# Patient Record
Sex: MALE | Race: WHITE | URBAN - METROPOLITAN AREA
[De-identification: names, ages, dates, MRNs, and addresses within clinical notes are randomized per-mention and may not be internally consistent; named-entity substitution may affect disease eponyms.]

---

## 2020-06-08 ENCOUNTER — VIRTUAL VISIT (OUTPATIENT)
Dept: PEDIATRICS | Facility: CLINIC | Age: 35
End: 2020-06-08
Payer: COMMERCIAL

## 2020-06-08 DIAGNOSIS — F41.1 GENERALIZED ANXIETY DISORDER: Primary | ICD-10-CM

## 2020-06-08 PROCEDURE — 99203 OFFICE O/P NEW LOW 30 MIN: CPT | Mod: 95 | Performed by: INTERNAL MEDICINE

## 2020-06-08 RX ORDER — LORAZEPAM 0.5 MG/1
0.5 TABLET ORAL EVERY 6 HOURS PRN
Qty: 10 TABLET | Refills: 0 | Status: SHIPPED | OUTPATIENT
Start: 2020-06-08 | End: 2020-06-15

## 2020-06-08 ASSESSMENT — PATIENT HEALTH QUESTIONNAIRE - PHQ9
5. POOR APPETITE OR OVEREATING: NEARLY EVERY DAY
SUM OF ALL RESPONSES TO PHQ QUESTIONS 1-9: 8

## 2020-06-08 ASSESSMENT — ANXIETY QUESTIONNAIRES
5. BEING SO RESTLESS THAT IT IS HARD TO SIT STILL: NEARLY EVERY DAY
2. NOT BEING ABLE TO STOP OR CONTROL WORRYING: NEARLY EVERY DAY
IF YOU CHECKED OFF ANY PROBLEMS ON THIS QUESTIONNAIRE, HOW DIFFICULT HAVE THESE PROBLEMS MADE IT FOR YOU TO DO YOUR WORK, TAKE CARE OF THINGS AT HOME, OR GET ALONG WITH OTHER PEOPLE: SOMEWHAT DIFFICULT
3. WORRYING TOO MUCH ABOUT DIFFERENT THINGS: SEVERAL DAYS
GAD7 TOTAL SCORE: 14
7. FEELING AFRAID AS IF SOMETHING AWFUL MIGHT HAPPEN: NOT AT ALL
1. FEELING NERVOUS, ANXIOUS, OR ON EDGE: NEARLY EVERY DAY
6. BECOMING EASILY ANNOYED OR IRRITABLE: SEVERAL DAYS

## 2020-06-08 NOTE — PATIENT INSTRUCTIONS
"It was good talking with you earlier today.  Here's a summary of what we discussed:    1)  Set up both a \"Lab Appointment\" (to get your blood and/or urine tests done) and a \"Nurse Only Appointment\" (to get your blood pressure rechecked) sometime in the next 2 weeks.  You can schedule these by calling  or using your [employer] account, if you have it.     2)   the book \"Full Catastrophe Living\", by Dewayne Skinner, and begin the course described therein.     3)  Call for counseling:  Our counselor at [facility] is Deangelo Mayorga, and the main number for [facility] counseling is [phone].  If he is not available, you can set up an appointment with another [facility] counselor that is.     You can also choose the [facility] group (which contracts with [facility]) at [phone].    Other non-[facility] counselors in the area:     [facility] [phone]      4)  We can start you on an occasional dose of lorazepam: infrequently.      5)  Set up a follow up in 2 weeks.     Reginald Patel MD  Internal Medicine and Pediatrics     "

## 2020-06-08 NOTE — PROGRESS NOTES
"Wicho Garner is a 34 year old male who is being evaluated via a billable telephone visit.      **PHQ9 and GAD7 Updated today. Pt previously used anxiety med, worked well, feels anxiety is affected mostly by his environment. Would like to discuss options.    The patient has been notified of following:     \"This telephone visit will be conducted via a call between you and your physician/provider. We have found that certain health care needs can be provided without the need for a physical exam.  This service lets us provide the care you need with a short phone conversation.  If a prescription is necessary we can send it directly to your pharmacy.  If lab work is needed we can place an order for that and you can then stop by our lab to have the test done at a later time.    Telephone visits are billed at different rates depending on your insurance coverage. During this emergency period, for some insurers they may be billed the same as an in-person visit.  Please reach out to your insurance provider with any questions.    If during the course of the call the physician/provider feels a telephone visit is not appropriate, you will not be charged for this service.\"    Patient has given verbal consent for Telephone visit?  Yes Le Matthew CMA on 6/8/2020 at 2:10 PM      What phone number would you like to be contacted at? [phone]    How would you like to obtain your AVS? Mail a copy    Subjective     Wicho Garner is a 34 year old male who presents via phone visit today for the following health issues:    HPI     Anxiety:    Had been contemplating this visit for about a month.  About a year ago, while trying to quit smoking, though had panic attack.  Tried lorazepam very infrequently in the past, about 10 years.  But then resumed smoking and did not need it.  Now has a chronic cough and would like to quit smoking.    Now most evenings feels like can be \"just sitting there\", but then suddenly can get " shortness of breath, feet feel cold, gets tingly all over, and starts to panic.  Cough becomes more obsessive.  Symptoms last 60-90 minutes.  Cannot focus on things.    Now is starting to happen in the AM.  Wondering about blood sugar levels, etc.    Has a toxic relationship with his exwife.  (They never really ).     Works as a coin enthusiast.  Lives by self.    Not suicidal.     Patient Active Problem List   Diagnosis     CARDIOVASCULAR SCREENING; LDL GOAL LESS THAN 160     Generalized anxiety disorder     History reviewed. No pertinent surgical history.    Social History     Tobacco Use     Smoking status: Current Every Day Smoker     Packs/day: 1.00     Types: Cigarettes     Smokeless tobacco: Current User   Substance Use Topics     Alcohol use: Yes     Family History   Problem Relation Age of Onset     Hypertension Father      Cerebrovascular Disease Father      Heart Disease Father         Heart attack     Lipids Father          Current Outpatient Medications   Medication Sig Dispense Refill     lorazepam (ATIVAN) 1 MG tablet Take 1 tablet by mouth every 6 hours as needed for anxiety. 20 tablet 0     sertraline (ZOLOFT) 50 MG tablet Take 1 tablet by mouth daily. 30 tablet 1     BP Readings from Last 3 Encounters:   07/11/11 144/64   10/28/10 150/70   04/01/10 114/58    Wt Readings from Last 3 Encounters:   07/11/11 96.8 kg (213 lb 4.8 oz)   10/28/10 100.7 kg (222 lb)   04/01/10 100.8 kg (222 lb 4.8 oz)                    Reviewed and updated as needed this visit by Provider         Review of Systems   CONSTITUTIONAL: NEGATIVE for fever, chills, change in weight  INTEGUMENTARY/SKIN: NEGATIVE for worrisome rashes, moles or lesions  EYES: NEGATIVE for vision changes or irritation  ENT/MOUTH: NEGATIVE for ear, mouth and throat problems  RESP: NEGATIVE for significant cough or SOB  BREAST: NEGATIVE for masses, tenderness or discharge  CV: NEGATIVE for chest pain, palpitations or peripheral edema  GI:  "NEGATIVE for nausea, abdominal pain, heartburn, or change in bowel habits  : NEGATIVE for frequency, dysuria, or hematuria  MUSCULOSKELETAL: NEGATIVE for significant arthralgias or myalgia  NEURO: NEGATIVE for weakness, dizziness or paresthesias  ENDOCRINE: NEGATIVE for temperature intolerance, skin/hair changes  HEME: NEGATIVE for bleeding problems  PSYCHIATRIC: NEGATIVE for changes in mood or affect       Objective   Reported vitals:  There were no vitals taken for this visit.   healthy, alert and no distress  PSYCH: Alert and oriented times 3; coherent speech, normal   rate and volume, able to articulate logical thoughts, able   to abstract reason, no tangential thoughts, no hallucinations   or delusions  His affect is normal  RESP: No cough, no audible wheezing, able to talk in full sentences  Remainder of exam unable to be completed due to telephone visits    Diagnostic Test Results:  Labs reviewed in Epic        Assessment/Plan:  1. Generalized anxiety disorder    Patient with stress surrounding his son's mom, and what sounds like classic panic attacks.  Would like to hold off on serotonin / norepinephrine reuptake inhibitor or selective serotonin reuptake inhibitor, but willing to consider if recommendations below not helping.   Patient Instructions   It was good talking with you earlier today.  Here's a summary of what we discussed:    1)  Set up both a \"Lab Appointment\" (to get your blood and/or urine tests done) and a \"Nurse Only Appointment\" (to get your blood pressure rechecked) sometime in the next 2 weeks.  You can schedule these by calling  or using your [employer] account, if you have it.     2)   the book \"Full Catastrophe Living\", by Dewayne Skinner, and begin the course described therein.     3)  Call for counseling:  Our counselor at [facility] is Deangelo Mayorga, and the main number for [place] counseling is [phone].  If he is not available, you can set up an appointment with " another [place] counselor that is.     You can also choose the [facility] group (which contracts with [place]) at [phone].    Other non-[place] counselors in the area:     [facility] [phone]      4)  We can start you on an occasional dose of lorazepam: infrequently.      Reginald Patel MD  Internal Medicine and Pediatrics        - TSH with free T4 reflex; Future  - Comprehensive metabolic panel (BMP + Alb, Alk Phos, ALT, AST, Total. Bili, TP); Future  - Vitamin D Deficiency; Future    No follow-ups on file.      Phone call duration:  28 minutes    Reginald Patel MD

## 2020-06-09 ASSESSMENT — ANXIETY QUESTIONNAIRES: GAD7 TOTAL SCORE: 14

## 2020-06-12 ENCOUNTER — TELEPHONE (OUTPATIENT)
Dept: PEDIATRICS | Facility: CLINIC | Age: 35
End: 2020-06-12

## 2020-06-12 DIAGNOSIS — F41.1 GENERALIZED ANXIETY DISORDER: ICD-10-CM

## 2020-06-12 RX ORDER — LORAZEPAM 0.5 MG/1
0.5 TABLET ORAL EVERY 6 HOURS PRN
Qty: 10 TABLET | Refills: 0 | Status: CANCELLED | OUTPATIENT
Start: 2020-06-12

## 2020-06-12 NOTE — TELEPHONE ENCOUNTER
Medication Question or Refill  Who is calling: Patient  What medication are you calling about (include dose and sig)?: Pending Prescriptions:                       Disp   Refills    LORazepam (ATIVAN) 0.5 MG tablet          10 tab*0            Sig: Take 1 tablet (0.5 mg) by mouth every 6 hours as           needed for anxiety    Controlled Substance Agreement on file: No  Who prescribed the medication?: Dr. Patel  Do you need a refill? No  When did you use the medication last? The other day  Patient offered an appointment? No  Do you have any questions or concerns?  Yes: Pt is wondering if he was given the wrong dosage. Pt states that they are having no effect and that he thinks he may have been given 0.5 instead of 1MG by accident.   Requested Pharmacy: not. asked  Okay to leave a detailed message?: Yes at Cell number on file:    Telephone Information:   Mobile [phone]       Janet Connolly on 6/12/2020 at 5:03 PM

## 2020-06-15 RX ORDER — LORAZEPAM 1 MG/1
1 TABLET ORAL EVERY 8 HOURS PRN
Qty: 10 TABLET | Refills: 0 | Status: SHIPPED | OUTPATIENT
Start: 2020-06-15 | End: 2023-11-27

## 2020-06-15 NOTE — TELEPHONE ENCOUNTER
Dr. Patel-  He had a telephone visit with you on 6/8/20. Says Lorazepam 0.5 is having no effect. He took 1mg in the past.   Please advise. Destinee Renee RN on 6/15/2020 at 8:13 AM

## 2020-06-29 ENCOUNTER — VIRTUAL VISIT (OUTPATIENT)
Dept: PEDIATRICS | Facility: CLINIC | Age: 35
End: 2020-06-29
Payer: COMMERCIAL

## 2020-06-29 DIAGNOSIS — F41.1 GENERALIZED ANXIETY DISORDER: Primary | ICD-10-CM

## 2020-06-29 PROCEDURE — 99213 OFFICE O/P EST LOW 20 MIN: CPT | Mod: 95 | Performed by: INTERNAL MEDICINE

## 2020-06-29 PROCEDURE — 96127 BRIEF EMOTIONAL/BEHAV ASSMT: CPT | Performed by: INTERNAL MEDICINE

## 2020-06-29 ASSESSMENT — ANXIETY QUESTIONNAIRES
1. FEELING NERVOUS, ANXIOUS, OR ON EDGE: NOT AT ALL
6. BECOMING EASILY ANNOYED OR IRRITABLE: SEVERAL DAYS
2. NOT BEING ABLE TO STOP OR CONTROL WORRYING: NOT AT ALL
GAD7 TOTAL SCORE: 1
3. WORRYING TOO MUCH ABOUT DIFFERENT THINGS: NOT AT ALL
5. BEING SO RESTLESS THAT IT IS HARD TO SIT STILL: NOT AT ALL
IF YOU CHECKED OFF ANY PROBLEMS ON THIS QUESTIONNAIRE, HOW DIFFICULT HAVE THESE PROBLEMS MADE IT FOR YOU TO DO YOUR WORK, TAKE CARE OF THINGS AT HOME, OR GET ALONG WITH OTHER PEOPLE: NOT DIFFICULT AT ALL
7. FEELING AFRAID AS IF SOMETHING AWFUL MIGHT HAPPEN: NOT AT ALL

## 2020-06-29 ASSESSMENT — PATIENT HEALTH QUESTIONNAIRE - PHQ9: 5. POOR APPETITE OR OVEREATING: NOT AT ALL

## 2020-06-29 NOTE — LETTER
[facility]  [address]  [address]  [place] [id number] [phone]        June 29, 2020      Wicho Garner  [address] [place] [address]      Dear Wicho,    As your health care provider, it is my medical opinion that you should avoid close contact with others and work from home if possible during this COVID-19 pandemic.  The current pandemic, especially when exposed to others whose social distancing and care is uncertain, may exacerbate your anxiety    Reginald Patel MD    [facility]

## 2020-06-29 NOTE — PROGRESS NOTES
"Wicho Garner is a 34 year old male who is being evaluated via a billable telephone visit.      The patient has been notified of following:     \"This telephone visit will be conducted via a call between you and your physician/provider. We have found that certain health care needs can be provided without the need for a physical exam.  This service lets us provide the care you need with a short phone conversation.  If a prescription is necessary we can send it directly to your pharmacy.  If lab work is needed we can place an order for that and you can then stop by our lab to have the test done at a later time.    Telephone visits are billed at different rates depending on your insurance coverage. During this emergency period, for some insurers they may be billed the same as an in-person visit.  Please reach out to your insurance provider with any questions.    If during the course of the call the physician/provider feels a telephone visit is not appropriate, you will not be charged for this service.\"    Patient has given verbal consent for Telephone visit?  Yes    What phone number would you like to be contacted at? [phone]    How would you like to obtain your AVS? Mail a copy    Subjective     Wicho Garner is a 34 year old male who presents via phone visit today for the following health issues:    Has been feeling great for the last 7-8 days now.  This has happened every since he quit smoking.      Believes that cutting out the smoking has helped.      Has used 6 doses of lorazepam; about every other day initially but none for the last 1 week. Never even had to fill the 1 mg strength; just taking the 0.5 mg strength.    No drinking or marijuana.     Never saw counselor or got book I recommended.     No suicidal ideation or homicidal ideation.  Sleeping well.  Works from home and is comfortable with that.       HPI     Anxiety Follow-Up    How are you doing with your anxiety since your last visit? " Improved, have only taken medication 4 times since 6/8/20, no SOB, blurry vision, has not smoked     Are you having other symptoms that might be associated with anxiety? No    Have you had a significant life event? No     Are you feeling depressed? No    Do you have any concerns with your use of alcohol or other drugs? No    Social History     Tobacco Use     Smoking status: Current Every Day Smoker     Packs/day: 1.00     Types: Cigarettes     Smokeless tobacco: Current User   Substance Use Topics     Alcohol use: Yes     Drug use: No     KRISHNA-7 SCORE 6/8/2020 6/29/2020   Total Score 14 1     PHQ 6/8/2020   PHQ-9 Total Score 8   Q9: Thoughts of better off dead/self-harm past 2 weeks Not at all           How many servings of fruits and vegetables do you eat daily?  2-3    On average, how many sweetened beverages do you drink each day (Examples: soda, juice, sweet tea, etc.  Do NOT count diet or artificially sweetened beverages)?   1    How many days per week do you exercise enough to make your heart beat faster?  0    How many minutes a day do you exercise enough to make your heart beat faster? 0    How many days per week do you miss taking your medication? 0        Patient Active Problem List   Diagnosis     Generalized anxiety disorder     History reviewed. No pertinent surgical history.    Social History     Tobacco Use     Smoking status: Current Every Day Smoker     Packs/day: 1.00     Types: Cigarettes     Smokeless tobacco: Current User   Substance Use Topics     Alcohol use: Yes     Family History   Problem Relation Age of Onset     Hypertension Father      Cerebrovascular Disease Father      Heart Disease Father         Heart attack     Lipids Father          Current Outpatient Medications   Medication Sig Dispense Refill     LORazepam (ATIVAN) 1 MG tablet Take 1 tablet (1 mg) by mouth every 8 hours as needed for anxiety 10 tablet 0     No Known Allergies  BP Readings from Last 3 Encounters:   07/11/11  144/64   10/28/10 150/70   04/01/10 114/58    Wt Readings from Last 3 Encounters:   07/11/11 96.8 kg (213 lb 4.8 oz)   10/28/10 100.7 kg (222 lb)   04/01/10 100.8 kg (222 lb 4.8 oz)                    Reviewed and updated as needed this visit by Provider         Review of Systems   CONSTITUTIONAL: NEGATIVE for fever, chills, change in weight  INTEGUMENTARY/SKIN: NEGATIVE for worrisome rashes, moles or lesions  EYES: NEGATIVE for vision changes or irritation  ENT/MOUTH: NEGATIVE for ear, mouth and throat problems  RESP: NEGATIVE for significant cough or SOB  BREAST: NEGATIVE for masses, tenderness or discharge  CV: NEGATIVE for chest pain, palpitations or peripheral edema  GI: NEGATIVE for nausea, abdominal pain, heartburn, or change in bowel habits  : NEGATIVE for frequency, dysuria, or hematuria  MUSCULOSKELETAL: NEGATIVE for significant arthralgias or myalgia  NEURO: NEGATIVE for weakness, dizziness or paresthesias  ENDOCRINE: NEGATIVE for temperature intolerance, skin/hair changes  HEME: NEGATIVE for bleeding problems  PSYCHIATRIC: NEGATIVE for changes in mood or affect       Objective   Reported vitals:  There were no vitals taken for this visit.   healthy, alert and no distress  PSYCH: Alert and oriented times 3; coherent speech, normal   rate and volume, able to articulate logical thoughts, able   to abstract reason, no tangential thoughts, no hallucinations   or delusions  His affect is normal  RESP: No cough, no audible wheezing, able to talk in full sentences  Remainder of exam unable to be completed due to telephone visits    Diagnostic Test Results:  Labs reviewed in Epic        Assessment/Plan:  1. Generalized anxiety disorder  Doing much better without any daily meds; has used lorazepam 0.5 mg only 6 times, and none in the last 1 week.     Follow up in 6 months.  Consider getting literature and setting up counseling if not contuing to improve.    2.  Will write generic letter about returning to work  .      No follow-ups on file.      Phone call duration:  22 minutes    Reginald Patel MD

## 2020-06-30 ASSESSMENT — ANXIETY QUESTIONNAIRES: GAD7 TOTAL SCORE: 1

## 2020-07-29 NOTE — PATIENT INSTRUCTIONS
"It was good talking with you earlier today.  Here's a summary of what we discussed:    1)  No changes in your treatment; consider picking up the book \"Full Catastrophe Living\", by Dewayne Skinner, and begin the course described therein.     2)  See my letter for your employer today.    3) follow up in 6 months for a complete physcial exam.     Reginald Patel MD  Internal Medicine and Pediatrics     " see HPI

## 2021-06-18 ENCOUNTER — VIRTUAL VISIT (OUTPATIENT)
Dept: PEDIATRICS | Facility: CLINIC | Age: 36
End: 2021-06-18
Payer: COMMERCIAL

## 2021-06-18 DIAGNOSIS — F41.1 GENERALIZED ANXIETY DISORDER: Primary | ICD-10-CM

## 2021-06-18 PROCEDURE — 99213 OFFICE O/P EST LOW 20 MIN: CPT | Mod: GT | Performed by: INTERNAL MEDICINE

## 2021-06-18 PROCEDURE — 96127 BRIEF EMOTIONAL/BEHAV ASSMT: CPT | Performed by: INTERNAL MEDICINE

## 2021-06-18 ASSESSMENT — ANXIETY QUESTIONNAIRES
7. FEELING AFRAID AS IF SOMETHING AWFUL MIGHT HAPPEN: NOT AT ALL
2. NOT BEING ABLE TO STOP OR CONTROL WORRYING: SEVERAL DAYS
6. BECOMING EASILY ANNOYED OR IRRITABLE: SEVERAL DAYS
1. FEELING NERVOUS, ANXIOUS, OR ON EDGE: NEARLY EVERY DAY
IF YOU CHECKED OFF ANY PROBLEMS ON THIS QUESTIONNAIRE, HOW DIFFICULT HAVE THESE PROBLEMS MADE IT FOR YOU TO DO YOUR WORK, TAKE CARE OF THINGS AT HOME, OR GET ALONG WITH OTHER PEOPLE: NOT DIFFICULT AT ALL
3. WORRYING TOO MUCH ABOUT DIFFERENT THINGS: SEVERAL DAYS
GAD7 TOTAL SCORE: 8
5. BEING SO RESTLESS THAT IT IS HARD TO SIT STILL: SEVERAL DAYS

## 2021-06-18 ASSESSMENT — PATIENT HEALTH QUESTIONNAIRE - PHQ9
SUM OF ALL RESPONSES TO PHQ QUESTIONS 1-9: 6
5. POOR APPETITE OR OVEREATING: SEVERAL DAYS

## 2021-06-18 NOTE — PATIENT INSTRUCTIONS
It was good talking with you earlier today.  Here's a summary of what we discussed:    1)  Let's have you fax your paperwork to me at     2)  Let's have you call us when you might need a refill for the lorazepam.    3)  I'd consider getting your COVID shot, and if not continuing to wear a mask in public.    We are now scheduling all people age 12 and older for COVID-19 vaccines (patients age 12-17 can only  the Pfizer vaccine).     To schedule your appointment please log in to [email or web address] using this link to see when and where we have openings. Appointments open up throughout the week, check back for additional openings.     If there are no appointments left, you will be unable to schedule. If you have technical difficulty using [email or web address], call [phone] for assistance.  If you would like to be added to our standby list, do that on our website: here.    Patients 12-17 years old must schedule their appointment at a location offering the Pfizer vaccine.  First dose Pfizer vaccines are available at the following sites with convenient hours, including Saturday appointments:    [facility] (former clinic, now serving as a vaccination-only site)    [facility]    Appointments for  Moderna and Jose (Medardo& Medardo) COVID-19 Vaccines for those 18 years and older is available at many locations in our system.  More information is on our website: [email or web address]. Check this website to stay up to date on COVID-19 vaccination information       Reginald Patel MD  Internal Medicine and Pediatrics

## 2021-06-18 NOTE — PROGRESS NOTES
Wicho is a 35 year old who is being evaluated via a billable telephone visit.      What phone number would you like to be contacted at? [phone]  How would you like to obtain your AVS? Mail a copy    Assessment & Plan     Anxiety:    Generally doing well.  He has successfully stopped marijuana.  Does not drink.  He is asking me to fill out paperwork for him to work from home, owing to anxiety, which seems reasonable. No lung conditions (he does not want COVID vaccine) , buthe main reason would be anxiety around social interactions in larger groups at work.  He does not want an end date to his restrictions.         Patient Instructions   It was good talking with you earlier today.  Here's a summary of what we discussed:    1)  Let's have you fax your paperwork to me at     2)  Let's have you call us when you might need a refill for the lorazepam.    3)  I'd consider getting your COVID shot, and if not continuing to wear a mask in public.    We are now scheduling all people age 12 and older for COVID-19 vaccines (patients age 12-17 can only  the Pfizer vaccine).     To schedule your appointment please log in to [employer] using this link to see when and where we have openings. Appointments open up throughout the week, check back for additional openings.     If there are no appointments left, you will be unable to schedule. If you have technical difficulty using [employer], call [phone] for assistance.  If you would like to be added to our standby list, do that on our website: here.    Patients 12-17 years old must schedule their appointment at a location offering the Pfizer vaccine.  First dose Pfizer vaccines are available at the following sites with convenient hours, including Saturday appointments:    [facility] (former clinic, now serving as a vaccination-only site)    [facility]  [facility]    Appointments for  Moderna and Jose (Medardo& Medardo) COVID-19 Vaccines for those 18 years and older is available at many locations in our system.  More information is on our website: [email or web address]. Check this website to stay up to date on COVID-19 vaccination information       Reginald Patel MD  Internal Medicine and Pediatrics                   Tobacco Cessation:   reports that he has been smoking cigarettes. He has been smoking about 1.00 pack per day. He uses smokeless tobacco.          Return in about 6 months (around 12/18/2021) for medicine followup, in person, with me.    Reginald Patel MD  [facility] [place] Sands is a 35 year old who presents for the following health issues     HPI     Anxiety Follow-Up    How are you doing with your anxiety since your last visit? Improved, pt working from home, not looking to keep take anxiety meds     Are you having other symptoms that might be associated with anxiety? No    Have you had a significant life event? No     Are you feeling depressed? Yes:  very recently     Do you have any concerns with your use of alcohol or other drugs? No    Has been doing very well.  Still has a few of his lorazepam.  He'd like to not take daily meds.  He likes working from home; this worked very well for him.  Has been spending more time with his children, and his wife.     He notes that since working from home, very infrequently has panic attacks.   He'd much prefer that he stay working from home, but his employer is requesting that he work from the office. He therefore needs some paperwork filled out stating that he should work from home.    His KRISHNA score went from 14 to 8 today.    No marijuana, no alcohol.     He is worried about a lung condition that might put him at risk for returning to work, although there is no lung abnormalities on his chest x-ray.      Social History      Tobacco Use     Smoking status: Current Every Day Smoker     Packs/day: 1.00     Types: Cigarettes     Smokeless tobacco: Current User   Substance Use Topics     Alcohol use: Yes     Drug use: No     KRISHNA-7 SCORE 6/8/2020 6/29/2020 6/18/2021   Total Score 14 1 8     PHQ 6/8/2020 6/18/2021   PHQ-9 Total Score 8 6   Q9: Thoughts of better off dead/self-harm past 2 weeks Not at all Not at all           Review of Systems   CONSTITUTIONAL: NEGATIVE for fever, chills, change in weight  INTEGUMENTARY/SKIN: NEGATIVE for worrisome rashes, moles or lesions  EYES: NEGATIVE for vision changes or irritation  ENT/MOUTH: NEGATIVE for ear, mouth and throat problems  RESP: NEGATIVE for significant cough or SOB  BREAST: NEGATIVE for masses, tenderness or discharge  CV: NEGATIVE for chest pain, palpitations or peripheral edema  GI: NEGATIVE for nausea, abdominal pain, heartburn, or change in bowel habits  : NEGATIVE for frequency, dysuria, or hematuria  MUSCULOSKELETAL: NEGATIVE for significant arthralgias or myalgia  NEURO: NEGATIVE for weakness, dizziness or paresthesias  ENDOCRINE: NEGATIVE for temperature intolerance, skin/hair changes  HEME: NEGATIVE for bleeding problems  PSYCHIATRIC: NEGATIVE for changes in mood or affect      Objective           Vitals:  No vitals were obtained today due to virtual visit.    Physical Exam   healthy, alert and no distress  PSYCH: Alert and oriented times 3; coherent speech, normal   rate and volume, able to articulate logical thoughts, able   to abstract reason, no tangential thoughts, no hallucinations   or delusions  His affect is normal  RESP: No cough, no audible wheezing, able to talk in full sentences  Remainder of exam unable to be completed due to telephone visits                Phone call duration: 23 minutes

## 2021-06-19 ASSESSMENT — ANXIETY QUESTIONNAIRES: GAD7 TOTAL SCORE: 8

## 2021-06-21 ENCOUNTER — TELEPHONE (OUTPATIENT)
Dept: PEDIATRICS | Facility: CLINIC | Age: 36
End: 2021-06-21
Payer: COMMERCIAL

## 2021-06-21 NOTE — TELEPHONE ENCOUNTER
General Call:     Who is calling:  Patient    Reason for Call:  Patient called to give fax number to fax letter. Fax # [phone]. Letter was faxed to [facility]. Letter was put in fax drawer for Dr. Patel at station.    What are your questions or concerns:  Wants letter faxed to number provided.    Date of last appointment with provider: 6/18/21    Okay to leave a detailed message:Yes at Cell number on file:    Telephone Information:   Mobile [phone]

## 2021-06-28 NOTE — TELEPHONE ENCOUNTER
Patient calling in and is checking on the status of this request. Please call patient with an update.

## 2021-07-05 NOTE — TELEPHONE ENCOUNTER
Pt called and needed Dr Patel to correct the note. But looking at it he never said he was going to work part time- it just said he had a follow up in 6 months to review and extend it than.. no mention of part time work. Called pt and left message on machine and pt can call w more questions on my direct line.     Akua Schroeder EMT 11:43 AM on July 5, 2021  [facility] Health Guide  [phone]

## 2021-08-24 ENCOUNTER — TELEPHONE (OUTPATIENT)
Dept: PEDIATRICS | Facility: CLINIC | Age: 36
End: 2021-08-24

## 2021-08-24 NOTE — TELEPHONE ENCOUNTER
Please review and if you except we can send this through my chart. Used your work note from last year 6-29-20. Pt had a virtual visit w you on 6-18-21.    thanks   Akua Schroeder EMT 3:45 PM on August 24, 2021  [facility] Health Guide  [phone]

## 2021-08-25 NOTE — TELEPHONE ENCOUNTER
Reason for Call:  Form, our goal is to have forms completed with 72 hours, however, some forms may require a visit or additional information.    Type of letter, form or note:  employer    Who is the form from?: employer Sent 8/24 later in day (if other please explain)    Where did the form come from: form was faxed in    What clinic location was the form placed at?: [facility]    Where the form was placed: n/a Been faxed to provider 8/24 Patient request to have returned by fax after filled out. Fax is provided in paperwork sent.    What number is listed as a contact on the form?: n/a Contact the patient on file       Additional comments: Please call the number provided if there are questins    Call taken on 8/25/2021 at 5:09 PM by Britney Arevalo

## 2021-08-26 NOTE — TELEPHONE ENCOUNTER
I didn't get paperwork.  I just wrote a letter.  If needs paperwork, he has to get it to us.    Reginald Patel MD  Internal Medicine and Pediatrics

## 2021-08-26 NOTE — TELEPHONE ENCOUNTER
Do you have paperwork for this patient? I don't see anything in your inbox?    Thank you  Melecio THOMSON

## 2021-08-26 NOTE — TELEPHONE ENCOUNTER
Called spoke to patient, he will have his employer refax to the station fax number as well as the main fax number.    Please keep an eye out for this.    Thank you  Melecio THOMSON

## 2021-09-02 ENCOUNTER — TELEPHONE (OUTPATIENT)
Dept: PEDIATRICS | Facility: CLINIC | Age: 36
End: 2021-09-02

## 2021-09-02 NOTE — TELEPHONE ENCOUNTER
"Received call from patient. Patient states his human resources department will be faxing a form for Dr. Patel to fill out. According to patient they are looking for clarification in regards to accommodations or restrictions in form. Patient wants Dr. Patel that his symptoms/condition that has been discussed during his recent appointments is \"still the same\". Patient wanted to \"thank Dr. Patel for having to deal with my clearly out of touch human resources department\". Patient wants Dr. Patel and his team to know he is very thankful for helping him work with his human resources department. Routing to station to look for fax.     Ramiro TILLMAN RN    "

## 2021-09-03 NOTE — TELEPHONE ENCOUNTER
Form has been received, and completed by Dr. Patel.    Faxed back to Elizabeth Plascencia Sr.  at [employer] at fax # [phone].    Sent copy to abstract.    Thank you  Melecio THOMSON

## 2021-12-06 ENCOUNTER — VIRTUAL VISIT (OUTPATIENT)
Dept: PEDIATRICS | Facility: CLINIC | Age: 36
End: 2021-12-06
Payer: COMMERCIAL

## 2021-12-06 DIAGNOSIS — F41.9 ANXIETY: Primary | ICD-10-CM

## 2021-12-06 PROCEDURE — 99213 OFFICE O/P EST LOW 20 MIN: CPT | Mod: GT | Performed by: INTERNAL MEDICINE

## 2021-12-06 ASSESSMENT — ANXIETY QUESTIONNAIRES
6. BECOMING EASILY ANNOYED OR IRRITABLE: SEVERAL DAYS
3. WORRYING TOO MUCH ABOUT DIFFERENT THINGS: NEARLY EVERY DAY
GAD7 TOTAL SCORE: 14
1. FEELING NERVOUS, ANXIOUS, OR ON EDGE: NEARLY EVERY DAY
5. BEING SO RESTLESS THAT IT IS HARD TO SIT STILL: SEVERAL DAYS
2. NOT BEING ABLE TO STOP OR CONTROL WORRYING: NEARLY EVERY DAY
IF YOU CHECKED OFF ANY PROBLEMS ON THIS QUESTIONNAIRE, HOW DIFFICULT HAVE THESE PROBLEMS MADE IT FOR YOU TO DO YOUR WORK, TAKE CARE OF THINGS AT HOME, OR GET ALONG WITH OTHER PEOPLE: EXTREMELY DIFFICULT
7. FEELING AFRAID AS IF SOMETHING AWFUL MIGHT HAPPEN: NOT AT ALL

## 2021-12-06 ASSESSMENT — PATIENT HEALTH QUESTIONNAIRE - PHQ9: 5. POOR APPETITE OR OVEREATING: NEARLY EVERY DAY

## 2021-12-06 NOTE — PROGRESS NOTES
Wicho is a 35 year old who is being evaluated via a billable video visit.      How would you like to obtain your AVS? Mail a copy  If the video visit is dropped, the invitation should be resent by: Text to cell phone: [phone]  Will anyone else be joining your video visit? No      Video Start Time: 10:00 AM     Last spoke with us back in June.  Has been working from home, exclusively.  Still not immunized.    Has been thriving while working exclusively from home.  Has used lorazepam very infrequently.      He still gets quite anxious about coming into the office.      He does go out at times, visiting his family.  He is fine with this.  Feels like going into his office drives a lot of his negativity.    Sleeping well.  No issues over the last 2 weeks.      He will fax us his paperwork to fill out.    Also, he has additional complaints of chronic cough.  Smokes.  His cough has gone on for years now.      No significant alcohol or drugs.  Does occasionally use some marijuana at night.      Assessment & Plan     Anxiety:    Still working from home, and he'd still like to continue this.  He has significant escalation of his anxiety when going into the office, and this seems to prohibit him from being productive and functional.  I believe it is reasonable to keep him working at home.  He asked whether an every-4 or every-6 month visit was even needed, and I stated that if we are filling out work restrictions, then yes it is.   Not overusing lorazepam.  No alcohol.  Occasional marijuana.              Tobacco Cessation:   reports that he has been smoking cigarettes. He has been smoking about 1.00 pack per day. He uses smokeless tobacco.  Tobacco Cessation Action Plan: Information offered: Patient not interested at this time    There are no Patient Instructions on file for this visit.    No follow-ups on file.    Reginald Patel MD  [facility] Sands is a 35 year old who presents for  the following health issues     HPI     Anxiety forms. Patient will bring in forms.         How many servings of fruits and vegetables do you eat daily?  0-1    On average, how many sweetened beverages do you drink each day (Examples: soda, juice, sweet tea, etc.  Do NOT count diet or artificially sweetened beverages)?   1    How many days per week do you exercise enough to make your heart beat faster? 3 or less    How many minutes a day do you exercise enough to make your heart beat faster? 60 or more    How many days per week do you miss taking your medication? 0        Review of Systems   Constitutional, HEENT, cardiovascular, pulmonary, gi and gu systems are negative, except as otherwise noted.  Constitutional, HEENT, cardiovascular, pulmonary, GI, , musculoskeletal, neuro, skin, endocrine and psych systems are negative, except as otherwise noted.  CONSTITUTIONAL: NEGATIVE for fever, chills, change in weight  ENT/MOUTH: NEGATIVE for ear, mouth and throat problems  RESP: NEGATIVE for significant cough or SOB  CV: NEGATIVE for chest pain, palpitations or peripheral edema    Objective           Vitals:  No vitals were obtained today due to virtual visit.    Physical Exam   GENERAL: Healthy, alert and no distress  EYES: Eyes grossly normal to inspection.  No discharge or erythema, or obvious scleral/conjunctival abnormalities.  RESP: No audible wheeze, cough, or visible cyanosis.  No visible retractions or increased work of breathing.    SKIN: Visible skin clear. No significant rash, abnormal pigmentation or lesions.  NEURO: Cranial nerves grossly intact.  Mentation and speech appropriate for age.  PSYCH: Mentation appears normal, affect normal/bright, judgement and insight intact, normal speech and appearance well-groomed.                Video-Visit Details    Type of service:  Video Visit    Video End Time:10:19 AM    Originating Location (pt. Location): Home    Distant Location (provider location):  [facility]  [facility]     Platform used for Video Visit: Ady

## 2021-12-07 ASSESSMENT — ANXIETY QUESTIONNAIRES: GAD7 TOTAL SCORE: 14

## 2021-12-16 NOTE — PROGRESS NOTES
Fax did not go through after faxing 4 times. Patient was called. He said to email it to [email or web address]  Form was emailed. He also said they would be calling on Monday with a new fax number.

## 2022-01-05 DIAGNOSIS — Z20.822 EXPOSURE TO 2019 NOVEL CORONAVIRUS: Primary | ICD-10-CM

## 2022-01-07 ENCOUNTER — TELEPHONE (OUTPATIENT)
Dept: PEDIATRICS | Facility: CLINIC | Age: 37
End: 2022-01-07
Payer: COMMERCIAL

## 2022-01-07 NOTE — TELEPHONE ENCOUNTER
"Patient calling inquiring if ok to take his prescription for lorazepam (ativan). Patient having anxiety as he is awaiting COVID test results. Patient is unsure if \"feeling in his chest\" is due to anxiety or if due to COVID or if due to acid reflux. Advised patient if having any difficulty breathing, SOB, wheezing, chest heaviness, patient should be seen in UC. Patient's wife also on phone, states \"I've been  to him for a long time, he's not having any other those symptoms, he just needs some assurance that he can take his anxiety medication even if he may have COVID\". Advised patient can take medication that was prescribed to him as directed by his provider. No wheezing, no SOB heard over phone. Patient does sound anxious. Patient inquiring if \"feeling in chest\" could be related to acid reflux, patient informs he was seen by urgency room in [place] 12/14/21 and was given pepcid, which helped. Patient states he has acid reflux \"all the time, and sometimes he'll burp and feel better\". Advised if he continues to have acid reflux symptoms, to be seen by his primary physician for ongoing therapy. Patient verbalized understanding and agreed with plan. RN reiterated that UC is available at  10am-8pm. Patient was not receptive to formal triage.     Ramiro TILLMAN RN    "

## 2022-03-01 ENCOUNTER — VIRTUAL VISIT (OUTPATIENT)
Dept: PEDIATRICS | Facility: CLINIC | Age: 37
End: 2022-03-01
Payer: COMMERCIAL

## 2022-03-01 DIAGNOSIS — F41.1 GENERALIZED ANXIETY DISORDER: Primary | ICD-10-CM

## 2022-03-01 PROCEDURE — 99215 OFFICE O/P EST HI 40 MIN: CPT | Mod: GT | Performed by: INTERNAL MEDICINE

## 2022-03-01 ASSESSMENT — ANXIETY QUESTIONNAIRES
5. BEING SO RESTLESS THAT IT IS HARD TO SIT STILL: NEARLY EVERY DAY
GAD7 TOTAL SCORE: 20
3. WORRYING TOO MUCH ABOUT DIFFERENT THINGS: NEARLY EVERY DAY
7. FEELING AFRAID AS IF SOMETHING AWFUL MIGHT HAPPEN: NEARLY EVERY DAY
IF YOU CHECKED OFF ANY PROBLEMS ON THIS QUESTIONNAIRE, HOW DIFFICULT HAVE THESE PROBLEMS MADE IT FOR YOU TO DO YOUR WORK, TAKE CARE OF THINGS AT HOME, OR GET ALONG WITH OTHER PEOPLE: VERY DIFFICULT
6. BECOMING EASILY ANNOYED OR IRRITABLE: NEARLY EVERY DAY
1. FEELING NERVOUS, ANXIOUS, OR ON EDGE: NEARLY EVERY DAY
2. NOT BEING ABLE TO STOP OR CONTROL WORRYING: MORE THAN HALF THE DAYS

## 2022-03-01 ASSESSMENT — PATIENT HEALTH QUESTIONNAIRE - PHQ9
SUM OF ALL RESPONSES TO PHQ QUESTIONS 1-9: 16
5. POOR APPETITE OR OVEREATING: NEARLY EVERY DAY

## 2022-03-01 NOTE — LETTER
March 1, 2022      Wicho Garner  [address] [address]        To Whom It May Concern:    Wicho Garner is seen in our clinic. He suffers from Social Anxiety that interferes with in person work.  We will adressing this with therapy and reassessing in the next 6 months.       Sincerely,        Reginald Patel MD

## 2022-03-01 NOTE — PROGRESS NOTES
Wicho is a 36 year old who is being evaluated via a billable video visit.      How would you like to obtain your AVS? Mail a copy  If the video visit is dropped, the invitation should be resent by: Text to cell phone: [phone]   Will anyone else be joining your video visit? No      Video Start Time: 3:55    Assessment & Plan     Generalized anxiety disorder  We had another long discussion about his anxiety about returning to work.  He is very frustrated that he is very productive while working from home, and does not see the reason that he needs to return to work.  His HR department is wanting him to return in person to work.  I discussed that we either have to help him with medications and therapy to get him ready to go back to work, or if he is unwilling to address the social anxiety issues with medications and therapy, that he should look for other employment.  In that spirit, we are getting him into a counselor and I have suggested some recommended readings.  He is not interested in medications in any way.  This may change if his job becomes at stake, but for now he is going to just try therapy and some self-directed reading.               Depression Screening Follow Up    PHQ 3/1/2022   PHQ-9 Total Score 16   Q9: Thoughts of better off dead/self-harm past 2 weeks Not at all     Last PHQ-9 3/1/2022   1.  Little interest or pleasure in doing things 3   2.  Feeling down, depressed, or hopeless 3   3.  Trouble falling or staying asleep, or sleeping too much 0   4.  Feeling tired or having little energy 3   5.  Poor appetite or overeating 2   6.  Feeling bad about yourself 0   7.  Trouble concentrating 2   8.  Moving slowly or restless 3   Q9: Thoughts of better off dead/self-harm past 2 weeks 0   PHQ-9 Total Score 16   Difficulty at work, home, or with people Very difficult       Return in about 6 months (around 9/1/2022) for medicine followup, with me, using a video visit.    Reginald Patel MD   HEALTH  [facility]    Has felt like his HRdepartment is hassling him to return to work.  Has been performing well working at home, but does not want to even consider going back to in person work; the environment feels stifling and difficult to perform in.  The idea of going back to work causes him to lose sleep.      He does not wish to take any meds for anxiety.  He does not wish to begin therapy.     He can go to grocery shop, and go out during the day.      Needs note faxed to [email or web address]      Eric Sands is a 36 year old who presents for the following health issues     HPI     Depression and Anxiety Follow-Up    How are you doing with your depression since your last visit? Worsened job issues    How are you doing with your anxiety since your last visit?  Worsened job issues    Are you having other symptoms that might be associated with depression or anxiety? Yes:  emotional roller coaster    Have you had a significant life event? Job Concerns     Do you have any concerns with your use of alcohol or other drugs? No    Social History     Tobacco Use     Smoking status: Current Every Day Smoker     Packs/day: 1.00     Types: Cigarettes     Smokeless tobacco: Current User   Substance Use Topics     Alcohol use: Yes     Drug use: No     PHQ 6/18/2021 12/6/2021 3/1/2022   PHQ-9 Total Score 6 - 16   Q9: Thoughts of better off dead/self-harm past 2 weeks Not at all Not at all Not at all     KRISHNA-7 SCORE 6/18/2021 12/6/2021 3/1/2022   Total Score 8 14 20     Last PHQ-9 3/1/2022   1.  Little interest or pleasure in doing things 3   2.  Feeling down, depressed, or hopeless 3   3.  Trouble falling or staying asleep, or sleeping too much 0   4.  Feeling tired or having little energy 3   5.  Poor appetite or overeating 2   6.  Feeling bad about yourself 0   7.  Trouble concentrating 2   8.  Moving slowly or restless 3   Q9: Thoughts of better off dead/self-harm past 2 weeks 0   PHQ-9 Total Score 16    Difficulty at work, home, or with people Very difficult     KRISHNA-7  3/1/2022   1. Feeling nervous, anxious, or on edge 3   2. Not being able to stop or control worrying 2   3. Worrying too much about different things 3   4. Trouble relaxing 3   5. Being so restless that it is hard to sit still 3   6. Becoming easily annoyed or irritable 3   7. Feeling afraid, as if something awful might happen 3   KRISHNA-7 Total Score 20   If you checked any problems, how difficult have they made it for you to do your work, take care of things at home, or get along with other people? Very difficult       Suicide Assessment Five-step Evaluation and Treatment (SAFE-T)          Review of Systems   CONSTITUTIONAL: NEGATIVE for fever, chills, change in weight  INTEGUMENTARY/SKIN: NEGATIVE for worrisome rashes, moles or lesions  EYES: NEGATIVE for vision changes or irritation  ENT/MOUTH: NEGATIVE for ear, mouth and throat problems  RESP: NEGATIVE for significant cough or SOB  BREAST: NEGATIVE for masses, tenderness or discharge  CV: NEGATIVE for chest pain, palpitations or peripheral edema  GI: NEGATIVE for nausea, abdominal pain, heartburn, or change in bowel habits  : NEGATIVE for frequency, dysuria, or hematuria  MUSCULOSKELETAL: NEGATIVE for significant arthralgias or myalgia  NEURO: NEGATIVE for weakness, dizziness or paresthesias  ENDOCRINE: NEGATIVE for temperature intolerance, skin/hair changes  HEME: NEGATIVE for bleeding problems  PSYCHIATRIC: NEGATIVE for changes in mood or affect      Objective           Vitals:  No vitals were obtained today due to virtual visit.    Physical Exam   GENERAL: Healthy, alert and no distress  EYES: Eyes grossly normal to inspection.  No discharge or erythema, or obvious scleral/conjunctival abnormalities.  RESP: No audible wheeze, cough, or visible cyanosis.  No visible retractions or increased work of breathing.    SKIN: Visible skin clear. No significant rash, abnormal pigmentation or  lesions.  NEURO: Cranial nerves grossly intact.  Mentation and speech appropriate for age.  PSYCH: Mentation appears normal, affect normal/bright, judgement and insight intact, normal speech and appearance well-groomed.                Video-Visit Details    Type of service:  Video Visit    Video End Time:4:39 PM    Originating Location (pt. Location): Home    Distant Location (provider location):  [facility] [employer]     Platform used for Video Visit: Coveroo     E5: Spent 45 minutes with pre-visit, post visit, and face-to-face time.

## 2022-03-01 NOTE — PATIENT INSTRUCTIONS
"It was good talking with you earlier today.  Here's a summary of what we discussed:    Call for counseling:  Our counselor at [facility] is Deangelo Mayorga, and the main number for [facility] counseling is 1 .  If he is not available, you can set up an appointment with another Prim counselor that is.     You can also choose to followup with [facility] at [phone]    While we are waiting to set up an appointment:  the book \"Full Catastrophe Living\", by Dewayne Skinner, and begin the course described therein.     Here is your note for work:    March 1, 2022      Wicho Garner  [address] [address]        To Whom It May Concern:    Wicho Garner is seen in our clinic. He suffers from Social Anxiety that interferes with in person work.  We will adressing this with therapy and reassessing in the next 6 months.       Sincerely,        Reginald Patel MD    "

## 2022-03-02 ASSESSMENT — ANXIETY QUESTIONNAIRES: GAD7 TOTAL SCORE: 20

## 2022-11-19 ENCOUNTER — NURSE TRIAGE (OUTPATIENT)
Dept: NURSING | Facility: CLINIC | Age: 37
End: 2022-11-19

## 2022-11-19 NOTE — TELEPHONE ENCOUNTER
"Osmel and Kaleb Ruggiero in the past.   Hx of recurrent strep throat every year. He states his tonsils are both enlarged--the size of golf balls. No white spots. Swallowing is painful. Breathing is OK. No cough. No shortness of breath or chest pain. T 104.5 yesterday and took Ibuprofen. No fever today. He states it is very difficult, but he has been able to swallow fluids and food. Sore throat pain=\"7-8\"= severe.   UC told him it would be a 4 hr wait. No virtual visit available until Tuesday.     Advised to be seen within 24 hrs: UC or ER, or virtual visit (another network ?).   Unable to obtain antibiotic without MD order--needs to be seen.   Caller hung up at this point in the call.     Iraida Ya RN Triage Nurse Advisor 5:41 PM 11/19/2022    Reason for Disposition    SEVERE (e.g., excruciating) throat pain    Additional Information    Negative: SEVERE difficulty breathing (e.g., struggling for each breath, speaks in single words, stridor)    Negative: Sounds like a life-threatening emergency to the triager    Negative: [1] Diagnosed strep throat AND [2] taking antibiotic AND [3] symptoms continue    Negative: Throat culture results, call about    Negative: Productive cough is main symptom    Negative: Non-productive cough is main symptom    Negative: Hoarseness is main symptom    Negative: Runny nose is main symptom    Negative: Uvula swelling is main symptom    Negative: [1] Drooling or spitting out saliva (because can't swallow) AND [2] normal breathing    Negative: Unable to open mouth completely    Negative: [1] Difficulty breathing AND [2] not severe    Negative: Fever > 104 F (40 C)    Negative: [1] Refuses to drink anything AND [2] for > 12 hours    Negative: [1] Drinking very little AND [2] dehydration suspected (e.g., no urine > 12 hours, very dry mouth, very lightheaded)    Negative: Patient sounds very sick or weak to the triager    Protocols used: SORE THROAT-A-AH      "

## 2022-11-20 ENCOUNTER — OFFICE VISIT (OUTPATIENT)
Dept: URGENT CARE | Facility: URGENT CARE | Age: 37
End: 2022-11-20
Payer: COMMERCIAL

## 2022-11-20 ENCOUNTER — NURSE TRIAGE (OUTPATIENT)
Dept: NURSING | Facility: CLINIC | Age: 37
End: 2022-11-20

## 2022-11-20 VITALS
OXYGEN SATURATION: 98 % | DIASTOLIC BLOOD PRESSURE: 66 MMHG | HEART RATE: 74 BPM | SYSTOLIC BLOOD PRESSURE: 108 MMHG | TEMPERATURE: 96.8 F

## 2022-11-20 DIAGNOSIS — H10.9 BACTERIAL CONJUNCTIVITIS OF BOTH EYES: ICD-10-CM

## 2022-11-20 DIAGNOSIS — B96.89 BACTERIAL CONJUNCTIVITIS OF BOTH EYES: ICD-10-CM

## 2022-11-20 DIAGNOSIS — J02.9 ACUTE PHARYNGITIS, UNSPECIFIED ETIOLOGY: Primary | ICD-10-CM

## 2022-11-20 LAB
DEPRECATED S PYO AG THROAT QL EIA: NEGATIVE
GROUP A STREP BY PCR: NOT DETECTED

## 2022-11-20 PROCEDURE — 87651 STREP A DNA AMP PROBE: CPT | Performed by: FAMILY MEDICINE

## 2022-11-20 PROCEDURE — 99204 OFFICE O/P NEW MOD 45 MIN: CPT | Performed by: FAMILY MEDICINE

## 2022-11-20 RX ORDER — AMOXICILLIN 875 MG
875 TABLET ORAL 2 TIMES DAILY
Qty: 20 TABLET | Refills: 0 | Status: SHIPPED | OUTPATIENT
Start: 2022-11-20 | End: 2022-11-30

## 2022-11-20 RX ORDER — POLYMYXIN B SULFATE AND TRIMETHOPRIM 1; 10000 MG/ML; [USP'U]/ML
1 SOLUTION OPHTHALMIC EVERY 6 HOURS
Qty: 10 ML | Refills: 0 | Status: SHIPPED | OUTPATIENT
Start: 2022-11-20 | End: 2022-11-27

## 2022-11-20 NOTE — PATIENT INSTRUCTIONS
Take Tylenol, Ibuprofen for the pain, fevers.     Drink ice-cold liquids.      Do warm saltwater gargles     Follow up if not better in a week.

## 2022-11-20 NOTE — PROGRESS NOTES
"SUBJECTIVE:   Wicho Garner is a 36 year old male presenting with a chief complaint of white spots at the back of the throat since last night, severe sore throat for the past three days, fevers up to 104 F, redness at the bilateral eyes (right more than left) with goopy discharge.  .   .  Course of illness is worsening. .    Severity severe.   Current and Associated symptoms:  As listed above.    Treatment measures tried include Ibuprofen (600 mg) Nyquil, over the counter pink eye drops.  .  Predisposing factors include his son has bacterial conjunctivitis.  .    Past Medical History:    No major medical problems except for recurrent strep throat.  .     Current Outpatient Medications   Medication Sig Dispense Refill     LORazepam (ATIVAN) 1 MG tablet Take 1 tablet (1 mg) by mouth every 8 hours as needed for anxiety 10 tablet 0     Social History     Tobacco Use     Smoking status: Every Day     Packs/day: 1.00     Types: Cigarettes     Smokeless tobacco: Current   Substance Use Topics     Alcohol use: Yes       ROS:  CONSTITUTIONAL:positive for fevers.   EYES:  Positive for redness and discharge in both eyes.    ENT/MOUTH:  Positive for sore throat and white spots at the back of the throat.      OBJECTIVE:  /66   Pulse 74   Temp 96.8  F (36  C) (Tympanic)   SpO2 98%   GENERAL APPEARANCE: healthy, alert and no distress.  No \"hot potato voice\"  EYES: conjunctiva/corneas- conjunctival injection each eye (right more than left).   HENT: TM's normal bilaterally and oropharynx is very erythematous.  The left tonsil is twice as large as the right tonsil.  I was unable to see white spots on the tonsils.  The uvula is midline.   NECK: no adenopathy but there is pain with palpation at the lymph nodes near the angles of the mandible.    RESP: lungs clear to auscultation - no rales, rhonchi or wheezes  CV: regular rates and rhythm, normal S1 S2, no murmur noted    LAB:    Results for orders placed or performed in " visit on 11/20/22   Streptococcus A Rapid Screen w/Reflex to PCR     Status: Normal    Specimen: Throat; Swab   Result Value Ref Range    Group A Strep antigen Negative Negative         ASSESSMENT:  Bacterial Conjunctivitis of the eyes.    Acute Pharyngitis    PLAN:  See orders in Epic    For the acute pharyngitis:  Rx:  Amoxicillin  follow up if not better in a week.   Ice-cold liquids  Tylenol, ibuprofen  Warm saltwater gargles  Pending lab:  Strep PCR Test.    For the Bilateral Bacterial Conjunctivitis:  Rx:  Polytrim Ophthalmic Solution  follow up if not better in one week            Jason Mayes MD

## 2022-11-20 NOTE — LETTER
[facility] [place]  [address]  [address]  [place] [id number] [phone]      November 20, 2022    RE:  Wicho Garner                                                                                                                                                       [address] [address]            To whom it may concern:    iWcho Garner is under my professional care at the [facility] on November 20, 2022.  Because of Mr. Garner's current illness, please excuse his absence from work on November 21, 2022   He  may return to work once he has been fever-free for 24 hours and once he starts to feel better.         Sincerely,        Jason Mayes MD    [facility]

## 2022-11-20 NOTE — TELEPHONE ENCOUNTER
TRIAGE CALL - Is this a 2nd Level Triage? NO  Nurse Triage SBAR - Patient calling   Situation:  Ear congestion   Background:    Urgent Care today RX were given:  trimethoprim-polymyxin b (POLYTRIM) 47734-9.1 UNIT/ML-% ophthalmic solution  amoxicillin (AMOXIL) 875 MG tablet  Assessment:  Took his medication and took a nap and woke up with his Right EAR feel Clogged - and some pain (started one hrs ago)  When bent over get better but is back again  EAR Pain - is harp but is not constnat  Patients denies  stomachache, vomiting, or diarrhea.   Measures taken: Ibuprofen + prescription around noon  Patient is able to speak in full long sentences without getting short of breath.  Recommended avoid caffeine, drinking more water, steam shower, humidifier, and rest  Recommended Disposition per protocol Home care and call back of it does not improved - Caller encouraged to continue to monitor symptoms, and to watch for worsening or not responding to measures taken. Call back nurse line with any other concerns.Patient verbalized understanding and agrees with plan    Maria Luisa Short RN Nurse Triage Advisor 3:25 PM 11/20/2022      Reason for Disposition    Ear congestion    Additional Information    Negative: Ear pain is main symptom    Negative: Hearing loss (complete or partial) is main symptom    Negative: Earwax is main concern    Negative: [1] Has nasal allergies AND [2] they are acting up    Negative: Earache persists > 1 hour    Negative: Pus or cloudy discharge from ear canal    Negative: Ear congestion present > 48 hours    Protocols used: EAR - CONGESTION-A-

## 2023-03-26 ENCOUNTER — HEALTH MAINTENANCE LETTER (OUTPATIENT)
Age: 38
End: 2023-03-26

## 2023-10-18 ENCOUNTER — OFFICE VISIT (OUTPATIENT)
Dept: URGENT CARE | Facility: URGENT CARE | Age: 38
End: 2023-10-18
Payer: COMMERCIAL

## 2023-10-18 ENCOUNTER — NURSE TRIAGE (OUTPATIENT)
Dept: PEDIATRICS | Facility: CLINIC | Age: 38
End: 2023-10-18
Payer: COMMERCIAL

## 2023-10-18 VITALS
HEART RATE: 57 BPM | DIASTOLIC BLOOD PRESSURE: 84 MMHG | TEMPERATURE: 97.7 F | OXYGEN SATURATION: 99 % | SYSTOLIC BLOOD PRESSURE: 127 MMHG

## 2023-10-18 DIAGNOSIS — R10.33 UMBILICAL PAIN: Primary | ICD-10-CM

## 2023-10-18 PROCEDURE — 99214 OFFICE O/P EST MOD 30 MIN: CPT | Performed by: FAMILY MEDICINE

## 2023-10-18 ASSESSMENT — PAIN SCALES - GENERAL: PAINLEVEL: SEVERE PAIN (6)

## 2023-10-18 NOTE — TELEPHONE ENCOUNTER
Called and relayed provider recommendation below. Patient verbalized understanding of recommendation and agreed with plan.     Ramrio TILLMAN RN 10/18/2023 at 5:09 PM

## 2023-10-18 NOTE — TELEPHONE ENCOUNTER
"Nurse Triage SBAR    Is this a 2nd Level Triage? YES, LICENSED PRACTITIONER REVIEW IS REQUIRED    Situation: Patient calling regarding possible hernia.     Background: Patient states he googled his symptoms and is thinking he has a belly button hernia.     Assessment: Patient experiencing lump, quarter sized, near belly button. First noticed yesterday. Denies any injury, heavy lifting, or exercise. Patient states area is tender to touch. When coughing, patient experiences pain at area of lump as well as in \"nether regions\". Patient states pain was worse 15 minutes ago, since then he has taken 4 tums (notes hx of GERD) and pain is now much milder. Lump is not hard, \"I wouldn't say that it moves, it's squishy\".    Protocol Recommended Disposition:   Go To ED/UCC Now (Or To Office With PCP Approval)    Recommendation: Second level triage requested: ED/UC/ or OV within what timeframe?      Routed to provider    Does the patient meet one of the following criteria for ADS visit consideration? 16+ years old, with an [facility] PCP     TIP  Providers, please consider if this condition is appropriate for management at one of our Acute and Diagnostic Services sites.     If patient is a good candidate, please use dotphrase <dot>triageresponse and select Refer to ADS to document.      Reason for Disposition   Hernia suspected (bulge in groin or abdomen) and painful or vomiting    Additional Information   Negative: Small growth, spot, bump, or pigmented area of skin (e.g., moles, skin tags, wart, melanoma, skin cancer)   Negative: Inguinal hernia previously diagnosed by a doctor (or NP/PA)   Negative: Followed a skin injury   Negative: Followed an insect bite   Negative: Swelling of ankle joint   Negative: Swelling of entire face   Negative: Swelling of eyelid   Negative: Swelling of knee joint   Negative: Swelling of labia   Negative: Swelling of lymph node suspected   Negative: Swelling of rectum   Negative: Swelling of scrotum   " Negative: Swelling of surgical incision   Negative: Swelling of vaccination site   Negative: Sounds like a life-threatening emergency to the triager    Protocols used: Skin Lump or Localized Swelling-JAMIE TILLMAN RN 10/18/2023 at 4:52 PM

## 2023-10-19 NOTE — PROGRESS NOTES
Assessment & Plan     Umbilical pain  - US Hernia Evaluation     His hx suggests likely a brief incarcerated hernia which reduced with intervention. He has no pain or mass present at this time.     I showed him a method in the supine position should a bulge/mass or discomfort present at the area that he can do to quickly reduce a hernia that presents.     Ultrasound ordered to assess contents although if imaging inconclusive and symptoms remain bothersome consider CT imaging or referral to general surgery.       Faustino Yarbrough MD   [facility]    Eric Sands is a 37 year old male who presents to clinic today for the following health issues:  Chief Complaint   Patient presents with    Urgent Care     Pt c/o dull, sharp pain above naval area. Pt talked to nurse line and was referred to UC.      HPI    No obvious discharge of the area. Hurt moderate level yesterday has subsided quite a lot in the last day. No vomiting /diarrhea    No prior hx of surgeries. Absent of fevers.     Hx of GERD, these symptoms are not consistent with prior flares which was managed/treated and had EGD done with GI specialists.       Patient Active Problem List    Diagnosis Date Noted    Generalized anxiety disorder 07/11/2011     Priority: Medium     Diagnosis updated by automated process. Provider to review and confirm.       Current Outpatient Medications   Medication    LORazepam (ATIVAN) 1 MG tablet     No current facility-administered medications for this visit.         Objective    /84 (BP Location: Right arm, Patient Position: Sitting)   Pulse 57   Temp 97.7  F (36.5  C) (Tympanic)   SpO2 99%   Physical Exam       Abd: superior region of navel there is a firm /hard area of skin at the area of reported previous pain, no pain with palpation of all quadrants, the skin of the navel area displays a white flaky substance. No bulging of contents thru the umbilicus at this time.     CV: HDS  GEN:  non-labored  No results found for any visits on 10/18/23.              The use of Dragon/[employer] dictation services may have been used to construct the content in this note; any grammatical or spelling errors are non-intentional. Please contact the author of this note directly if you are in need of any clarification.

## 2023-10-19 NOTE — PATIENT INSTRUCTIONS
Perform the 'silo' technique as I showed you if you feel a small bulge come thru the umbilical/navel area to push it back in      If the pain does not resolve within a half hour please seek medical attention immediately      To confirm the diagnosis call to set up your ultrasound   [phone]

## 2023-10-23 ENCOUNTER — MYC MEDICAL ADVICE (OUTPATIENT)
Dept: PEDIATRICS | Facility: CLINIC | Age: 38
End: 2023-10-23
Payer: COMMERCIAL

## 2023-10-24 ENCOUNTER — HOSPITAL ENCOUNTER (OUTPATIENT)
Dept: ULTRASOUND IMAGING | Facility: CLINIC | Age: 38
Discharge: HOME | End: 2023-10-24
Attending: FAMILY MEDICINE | Admitting: FAMILY MEDICINE
Payer: COMMERCIAL

## 2023-10-24 DIAGNOSIS — R10.33 UMBILICAL PAIN: ICD-10-CM

## 2023-10-24 PROCEDURE — 76705 ECHO EXAM OF ABDOMEN: CPT

## 2023-10-29 ENCOUNTER — NURSE TRIAGE (OUTPATIENT)
Dept: NURSING | Facility: CLINIC | Age: 38
End: 2023-10-29
Payer: COMMERCIAL

## 2023-10-29 NOTE — TELEPHONE ENCOUNTER
Patient was returning a phone call from the Urgent Care and was routed there to complete the call.    Reason for Disposition   [1] Follow-up call to recent contact AND [2] information only call, no triage required    Additional Information   Negative: [1] Caller is not with the adult (patient) AND [2] reporting urgent symptoms   Negative: Lab result questions   Negative: Medication questions   Negative: Caller can't be reached by phone   Negative: Caller has already spoken to PCP or another triager   Negative: RN needs further essential information from caller in order to complete triage   Negative: Requesting regular office appointment   Negative: [1] Caller requesting NON-URGENT health information AND [2] PCP's office is the best resource   Negative: Health Information question, no triage required and triager able to answer question   Negative: General information question, no triage required and triager able to answer question   Negative: Question about upcoming scheduled test, no triage required and triager able to answer question   Negative: [1] Caller is not with the adult (patient) AND [2] probable NON-URGENT symptoms    Protocols used: Information Only Call - No Triage-A-

## 2023-11-08 ENCOUNTER — VIRTUAL VISIT (OUTPATIENT)
Dept: FAMILY MEDICINE | Facility: CLINIC | Age: 38
End: 2023-11-08
Payer: COMMERCIAL

## 2023-11-08 DIAGNOSIS — N52.9 ERECTILE DYSFUNCTION, UNSPECIFIED ERECTILE DYSFUNCTION TYPE: ICD-10-CM

## 2023-11-08 DIAGNOSIS — Z72.0 TOBACCO ABUSE: ICD-10-CM

## 2023-11-08 DIAGNOSIS — R10.30 LOWER ABDOMINAL PAIN: ICD-10-CM

## 2023-11-08 DIAGNOSIS — Z80.42 FAMILY HISTORY OF PROSTATE CANCER: ICD-10-CM

## 2023-11-08 DIAGNOSIS — K42.9 UMBILICAL HERNIA WITHOUT OBSTRUCTION AND WITHOUT GANGRENE: Primary | ICD-10-CM

## 2023-11-08 PROCEDURE — 99214 OFFICE O/P EST MOD 30 MIN: CPT | Mod: VID | Performed by: FAMILY MEDICINE

## 2023-11-08 NOTE — PROGRESS NOTES
Wicho is a 37 year old who is being evaluated via a billable video visit.      How would you like to obtain your AVS? MyChart  If the video visit is dropped, the invitation should be resent by:   Will anyone else be joining your video visit? No          Assessment & Plan     Umbilical hernia without obstruction and without gangrene  We will refer to general surgery for surgical consultation.  We discussed the warning signs of a strangulated hernia that would merit emergent evaluation.  - Adult General Surg Referral; Future    Erectile dysfunction, unspecified erectile dysfunction type  No significant change in his libido nor ability to initially achieve erection.  We discussed that this likely is psychological but that there certainly could be some vascular issues contributing to this as well.  I specifically highlighted his tobacco use and how that could be impacting his erectile dysfunction.  We agree that he is not really a candidate for Viagra or other erectile dysfunction medication at the present time.  We will continue to monitor this clinically.    Lower abdominal pain  His abdominal pain does not sound like it is prostatitis (he is reassured about this) nor does it sound like diverticular disease or a UTI.  This sounds more muscular or gas related to me given its brief course.  I recommended continuing to monitor this for the time being.    Tobacco abuse  Strongly encourage smoking cessation given his erectile dysfunction episode as well has GERD symptoms which she reports are pervasive.  In addition, if he is going to proceed with umbilical hernia repair tobacco use could slow his postoperative recovery.    Reassured about his family history of prostate cancer in his father.  I do not think he is ready yet to start having annual PSAs drawn.  Could consider starting this at age 43.    His connection on his video visit abruptly ended we did not have a chance to completely finish our visit to review my  recommendations for each of the above categories, specifically options to curb his tobacco abuse.      30 minutes spent by me on the date of the encounter doing chart review, history and exam, documentation and further activities per the note       Nicotine/Tobacco Cessation:  He reports that he has been smoking cigarettes. He has been smoking an average of 1 pack per day. He uses smokeless tobacco.  Nicotine/Tobacco Cessation Plan:   Information offered: Patient not interested at this time          João Barnes Jr, MD  [facility] PRIOR LORRIE Sands is a 37 year old, presenting for the following health issues:  Results, Abdominal Pain, and Sexual Problem        11/8/2023    12:58 PM   Additional Questions   Roomed by Julieta YAÑEZ CMA       History of Present Illness       Reason for visit:  A bunch of reasons but a PSA imo is needed  Symptom onset:  1-2 weeks ago  Symptoms include:  ED, lower stomach pain  Symptom intensity:  Mild  Symptom progression:  Staying the same  Had these symptoms before:  No  What makes it worse:  Not that i dan think of  What makes it better:  Not really    He eats 2-3 servings of fruits and vegetables daily.He consumes 0 sweetened beverage(s) daily.He exercises with enough effort to increase his heart rate 9 or less minutes per day.  He exercises with enough effort to increase his heart rate 3 or less days per week.   He is taking medications regularly.     Recently diagnosed with a small periumbilical hernia.  Hernia has not been causing pain.  He is interested in meeting with a general surgeon to discuss surgical options.  He is a little concerned about a cough that he has intermittently which he thinks is related to smoking and how that could impact his recovery postoperatively.    Having intermittent erectile dysfunction that began recently.  No problem with arousal or obtaining an erection, but intermittently can't maintain an erection.  He is concerned  this could be a psychological problem.  He reports he and his wife are able to successfully have intercourse this morning.    Last night had lower abdominal pain that radiated to scrotum.  This was also accompanied by one episode of dysuria that had resolved by this morning when urinating.    Was preceded him by taking 4 fiber tablets.        Patient would also like to get PSA bloodwork done. Is very concerned about prostate cancer. He would also like to go over scan he had done for his hernia. Patient is having lower abdominal pain. He has also been having some intimacy concerns.             Review of Systems         Objective           Vitals:  No vitals were obtained today due to virtual visit.    Physical Exam   GENERAL: Healthy, alert and no distress  EYES: Eyes grossly normal to inspection.  No discharge or erythema, or obvious scleral/conjunctival abnormalities.  RESP: No audible wheeze, cough, or visible cyanosis.  No visible retractions or increased work of breathing.    SKIN: Visible skin clear. No significant rash, abnormal pigmentation or lesions.  NEURO: Cranial nerves grossly intact.  Mentation and speech appropriate for age.  PSYCH: Mentation appears normal, affect normal/bright, judgement and insight intact, normal speech and appearance well-groomed.                Video-Visit Details    Type of service:  Video Visit     Originating Location (pt. Location): Home    Distant Location (provider location):  On-site  Platform used for Video Visit: Proberry

## 2023-11-27 ENCOUNTER — OFFICE VISIT (OUTPATIENT)
Dept: SURGERY | Facility: CLINIC | Age: 38
End: 2023-11-27
Payer: COMMERCIAL

## 2023-11-27 ENCOUNTER — TELEPHONE (OUTPATIENT)
Dept: SURGERY | Facility: PHYSICIAN GROUP | Age: 38
End: 2023-11-27

## 2023-11-27 VITALS
HEART RATE: 77 BPM | DIASTOLIC BLOOD PRESSURE: 74 MMHG | WEIGHT: 213 LBS | SYSTOLIC BLOOD PRESSURE: 136 MMHG | HEIGHT: 73 IN | OXYGEN SATURATION: 94 % | BODY MASS INDEX: 28.23 KG/M2

## 2023-11-27 DIAGNOSIS — K42.9 UMBILICAL HERNIA WITHOUT OBSTRUCTION AND WITHOUT GANGRENE: Primary | ICD-10-CM

## 2023-11-27 PROCEDURE — 99203 OFFICE O/P NEW LOW 30 MIN: CPT | Performed by: SURGERY

## 2023-11-27 NOTE — LETTER
November 27, 2023          João Barnes Jr., MD  [address] [place] [address]      RE:   Wicho Garner 1985      Dear Colleague,    Thank you for referring your patient, Wicho Garner, to [facility]. Please see a copy of my visit note below.    Wicho is a 37 year old male who presents for hernia evaluation at the umbilicus.  The patient has noticed a bulge.  Pain has been present.  His hernia has been present since childhood but he notes that when he was coughing recently it became acutely painful.  The patient has not had previous abdominal surgery.  Patient does not report that increased activity/lifting causes pain. Employment does not require lifting.    Constipation No  DysuriaNo  Cough Yes  Smoking Yes    Pt's chart has been reviewed for PMH, PSH, allergies, medications, social and family history.    ROS:  Pulm:  No shortness of breath, dyspnea on exertion, cough, or hemoptysis  CV:  negative  ABD:  See chief complaint  :  Negative  All other systems on 10 point review are negative.    There were no vitals taken for this visit.  Physical exam:  Patient able to get up on table without difficulty.  abdomen is soft without significant tenderness, masses, organomegaly or guarding  bowel sounds are positive and no caput medusa noted.  There is eversion to the umbilical skin superiorly which is a partly reducible and fat-containing hernia. It is sensitive but not acutely tender.    Imaging  Ultrasound done 10/24/2023 shows a reducible umbilical hernia with a defect of approximately 1.7cm.    Assesment: umbilical hernia, symptomatic.    Plan: The nature of hernias, anatomic considerations, indications and approaches to repair were discussed.  Risks of operative intervention were discussed including infection, bleeding, harm to structures as well as recurrence, which is about 5% per decade of life.  Post operative recuperation and activity  limitations were reviewed as well.  The patient is interested in pursuing repair and we will assist in scheduling this with him.    Again, thank you for allowing me to participate in the care of your patient.      Sincerely,      MD ALIYA Carmona/ambika       D: 11/27/2023  T: 2:38 pm

## 2023-11-27 NOTE — LETTER
2023       Wicho Garner  [address] [place] [address]     RE: [id number]  : 1985    Wicho Garner has been scheduled for surgery on 2024 at 8:15 AM  at [facility] with Dr Ulises Sheldon.  The hospital is located at [address] in [place].    Please check in at the Surgery reception desk at 6:15 AM . This is located in the back of the hospital on the East side, just past the Emergency Room entrance.     DO NOT EAT OR DRINK ANYTHING 8 HOURS BEFORE YOUR ARRIVAL TIME.   You may have sips of clear liquids up until 2 hours before your arrival time. If you have been advised to take your medication, please do this early in the morning with just sips of clear liquid.     Hospital regulations require an updated pre-operative examination to be completed within 30 days of the procedure. This can be done by your primary care provider. Please ask them to fax documentation to [phone]. We also recommend you bring a copy with you.     You should shower before your surgery with Hibiclens or Exidine soap.  This can be found at your local pharmacy or you can pick it up from our office for free.  Please call our office if you have any questions.     You will be required to have an Adult (friend or family member) drive you home after your surgery and arrange for an adult to stay with you until the next morning.     You will receive several calls from our staff 3-7 days prior to your scheduled procedure with further details and to answer any questions you may have.    It is sometimes necessary to adjust the surgery schedule due to emergencies and additions to the schedule.  If your surgery is affected by this, we greatly appreciate your flexibility and understanding in this matter    It is best if you call regarding post-operative questions between the hours of 8:00 am & 3:00 pm Monday-Friday, so you have access to the daytime care team that know you  best.  Prescription refills are accepted during regular office hours only.          Please do not bring any Disability or FMLA papers to the hospital.  They need to be either faxed ([phone]), mailed or hand delivered to our office by you or a family member for completion.  Please allow 14 business days to complete paperwork.        If you have questions or concerns, please contact our office at [phone].

## 2023-11-27 NOTE — PROGRESS NOTES
Wicho is a 37 year old male who presents for hernia evaluation at the umbilicus.  The patient has noticed a bulge.  Pain has been present.  His hernia has been present since childhood but he notes that when he was coughing recently it became acutely painful.  The patient has not had previous abdominal surgery.  Patient does not report that increased activity/lifting causes pain. Employment does not require lifting.    Constipation No  DysuriaNo  Cough Yes  Smoking Yes    Pt's chart has been reviewed for PMH, PSH, allergies, medications, social and family history.    ROS:  Pulm:  No shortness of breath, dyspnea on exertion, cough, or hemoptysis  CV:  negative  ABD:  See chief complaint  :  Negative  All other systems on 10 point review are negative.    There were no vitals taken for this visit.  Physical exam:  Patient able to get up on table without difficulty.  abdomen is soft without significant tenderness, masses, organomegaly or guarding  bowel sounds are positive and no caput medusa noted.  There is eversion to the umbilical skin superiorly which is a partly reducible and fat-containing hernia. It is sensitive but not acutely tender.    Imaging  Ultrasound done 10/24/2023 shows a reducible umbilical hernia with a defect of approximately 1.7cm.    Assesment: umbilical hernia, symptomatic.    Plan: The nature of hernias, anatomic considerations, indications and approaches to repair were discussed.  Risks of operative intervention were discussed including infection, bleeding, harm to structures as well as recurrence, which is about 5% per decade of life.  Post operative recuperation and activity limitations were reviewed as well.  The patient is interested in pursuing repair and we will assist in scheduling this with him.    Ulises Sheldon MD  11/27/2023 1:03 PM    Please route or send letter to:  Primary Care Provider (PCP)

## 2023-11-27 NOTE — TELEPHONE ENCOUNTER
Type of surgery: OPEN REPAIR  UMBILICAL HERNIA WITH MESH   Location of surgery: [facility]  Date and time of surgery: 1/12/2024 @ 8:15 AM   Surgeon: SAVANNAH HENRIQUEZ MD    Pre-Op Appt Date: PATIENT TO SCHEDULE    Post-Op Appt Date: PATIENT TO SCHEDULE     Packet sent out: Yes  Pre-cert/Authorization completed:  Not Applicable  Date: 11/27/2023         OPEN REPAIR  UMBILICAL HERNIA WITH MESH GENERAL PT INST TO HAVE H&P WITH PCP 60 MIN REQ (80 AVG) PA ASSIST RMO NMS

## 2024-05-26 ENCOUNTER — HEALTH MAINTENANCE LETTER (OUTPATIENT)
Age: 39
End: 2024-05-26

## 2024-09-26 ENCOUNTER — TELEPHONE (OUTPATIENT)
Dept: FAMILY MEDICINE | Facility: CLINIC | Age: 39
End: 2024-09-26

## 2024-09-26 ENCOUNTER — OFFICE VISIT (OUTPATIENT)
Dept: FAMILY MEDICINE | Facility: CLINIC | Age: 39
End: 2024-09-26
Payer: COMMERCIAL

## 2024-09-26 VITALS
WEIGHT: 218 LBS | HEIGHT: 73 IN | RESPIRATION RATE: 16 BRPM | OXYGEN SATURATION: 95 % | TEMPERATURE: 98.4 F | HEART RATE: 84 BPM | DIASTOLIC BLOOD PRESSURE: 70 MMHG | BODY MASS INDEX: 28.89 KG/M2 | SYSTOLIC BLOOD PRESSURE: 122 MMHG

## 2024-09-26 DIAGNOSIS — K21.00 GASTROESOPHAGEAL REFLUX DISEASE WITH ESOPHAGITIS WITHOUT HEMORRHAGE: Primary | ICD-10-CM

## 2024-09-26 DIAGNOSIS — K21.00 GASTROESOPHAGEAL REFLUX DISEASE WITH ESOPHAGITIS WITHOUT HEMORRHAGE: ICD-10-CM

## 2024-09-26 LAB
ALBUMIN SERPL BCG-MCNC: 4.1 G/DL (ref 3.5–5.2)
ALP SERPL-CCNC: 40 U/L (ref 40–150)
ALT SERPL W P-5'-P-CCNC: 30 U/L (ref 0–70)
ANION GAP SERPL CALCULATED.3IONS-SCNC: 10 MMOL/L (ref 7–15)
AST SERPL W P-5'-P-CCNC: 22 U/L (ref 0–45)
BILIRUB SERPL-MCNC: 0.3 MG/DL
BUN SERPL-MCNC: 17 MG/DL (ref 6–20)
CALCIUM SERPL-MCNC: 10.4 MG/DL (ref 8.8–10.4)
CHLORIDE SERPL-SCNC: 105 MMOL/L (ref 98–107)
CREAT SERPL-MCNC: 1.24 MG/DL (ref 0.67–1.17)
EGFRCR SERPLBLD CKD-EPI 2021: 75 ML/MIN/1.73M2
ERYTHROCYTE [DISTWIDTH] IN BLOOD BY AUTOMATED COUNT: 12.8 % (ref 10–15)
GLUCOSE SERPL-MCNC: 105 MG/DL (ref 70–99)
HCO3 SERPL-SCNC: 24 MMOL/L (ref 22–29)
HCT VFR BLD AUTO: 51.9 % (ref 40–53)
HGB BLD-MCNC: 17.2 G/DL (ref 13.3–17.7)
MCH RBC QN AUTO: 30.4 PG (ref 26.5–33)
MCHC RBC AUTO-ENTMCNC: 33.1 G/DL (ref 31.5–36.5)
MCV RBC AUTO: 92 FL (ref 78–100)
PLATELET # BLD AUTO: 252 10E3/UL (ref 150–450)
POTASSIUM SERPL-SCNC: 4.2 MMOL/L (ref 3.4–5.3)
PROT SERPL-MCNC: 7.2 G/DL (ref 6.4–8.3)
RBC # BLD AUTO: 5.65 10E6/UL (ref 4.4–5.9)
SODIUM SERPL-SCNC: 139 MMOL/L (ref 135–145)
WBC # BLD AUTO: 13.1 10E3/UL (ref 4–11)

## 2024-09-26 PROCEDURE — 36415 COLL VENOUS BLD VENIPUNCTURE: CPT | Performed by: FAMILY MEDICINE

## 2024-09-26 PROCEDURE — 99214 OFFICE O/P EST MOD 30 MIN: CPT | Performed by: FAMILY MEDICINE

## 2024-09-26 PROCEDURE — 85027 COMPLETE CBC AUTOMATED: CPT | Performed by: FAMILY MEDICINE

## 2024-09-26 PROCEDURE — G2211 COMPLEX E/M VISIT ADD ON: HCPCS | Performed by: FAMILY MEDICINE

## 2024-09-26 PROCEDURE — 80053 COMPREHEN METABOLIC PANEL: CPT | Performed by: FAMILY MEDICINE

## 2024-09-26 RX ORDER — CALCIUM CARBONATE 500 MG/1
1 TABLET, CHEWABLE ORAL 2 TIMES DAILY
COMMUNITY

## 2024-09-26 NOTE — PROGRESS NOTES
"  Assessment & Plan     (K21.00) Gastroesophageal reflux disease with esophagitis without hemorrhage  (primary encounter diagnosis)  Comment: pt has gerd for many years since he was teenager. Sometimes better than other times. Certain food trigger it, tried prilosec several times and did not work. He takes otc tums often and most of time it helped but sometimes it did not help. He feels middle chest pain, after burp or leaning forward or sit up feels better. Lay down make it worse. Denies fever, n/v/d/c, black stool and blood in stool, weight loss.   Pt state several years ago he had egd and was told it was normal.  Exam is not remarkable. He smokes one pack a day since he was 15 years old. Mild elevated wbc and stable.   DDX:GERD, gastritis, stomach ulcer?   Plan: omeprazole (PRILOSEC) 20 MG DR capsule,         Comprehensive metabolic panel (BMP + Alb, Alk         Phos, ALT, AST, Total. Bili, TP), CBC with         platelets, Helicobacter pylori Antigen Stool,         Adult GI  Referral - Consult Only        Will follow-up lab. Advise to avoid anything that triggers his GERD. Discussed the etiology of GERD with patient.  Encouraged lifestyle changes and the need for a longterm regimen of medications to help reduce symptoms. Side effect of prilosec addressed.  Discussed the potential for serious complications if symptoms are not resolved.    Discussed raising the head of the bed 4 to 6 inches; avoiding chocolate, coffee, peppermint, fruit juices, tomatoes, greasy and spicy foods.   Follow-up in one month     The longitudinal plan of care for the diagnosis(es)/condition(s) as documented were addressed during this visit. Due to the added complexity in care, I will continue to support Wicho in the subsequent management and with ongoing continuity of care.          BMI  Estimated body mass index is 28.76 kg/m  as calculated from the following:    Height as of this encounter: 1.854 m (6' 1\").    Weight as of " "this encounter: 98.9 kg (218 lb).   Weight management plan: Discussed healthy diet and exercise guidelines      See Patient Instructions    Eric Sands is a 38 year old, presenting for the following health issues:  Gastric Problem (C.o about gerd, sx on and off since his teens, chest pain, occ feels a reflux of food, takes jovani and tried omeprazole in the past. Sometimes medications works and sometimes not )        9/26/2024     9:48 AM   Additional Questions   Roomed by Gen ROBBIE CMA   Accompanied by spouse     Gastric Problem    History of Present Illness       Reason for visit:  Major Concerns with Gerd    He eats 0-1 servings of fruits and vegetables daily.He consumes 1 sweetened beverage(s) daily.He exercises with enough effort to increase his heart rate 9 or less minutes per day.  He exercises with enough effort to increase his heart rate 3 or less days per week.   He is taking medications regularly.            Review of Systems  Constitutional, HEENT, cardiovascular, pulmonary, gi and gu systems are negative, except as otherwise noted.      Objective    /70 (BP Location: Left arm, Patient Position: Sitting)   Pulse 84   Temp 98.4  F (36.9  C) (Oral)   Resp 16   Ht 1.854 m (6' 1\")   Wt 98.9 kg (218 lb)   SpO2 95%   BMI 28.76 kg/m    Body mass index is 28.76 kg/m .  Physical Exam   GENERAL: alert and no distress  NECK: no adenopathy, no asymmetry, masses, or scars  RESP: lungs clear to auscultation - no rales, rhonchi or wheezes  CV: regular rate and rhythm, normal S1 S2, no S3 or S4, no murmur, click or rub, no peripheral edema  ABDOMEN: soft, nontender, no hepatosplenomegaly, no masses and bowel sounds normal  MS: no gross musculoskeletal defects noted, no edema            Signed Electronically by: Genevieve Rodriguez MD    "

## 2024-09-26 NOTE — TELEPHONE ENCOUNTER
Prior Authorization Retail Medication Request    Medication/Dose: omeprazole (PRILOSEC) 20 MG DR capsule   Diagnosis and ICD code (if different than what is on RX):  Gastroesophageal reflux disease with esophagitis without hemorrhage   New/renewal/insurance change PA/secondary ins. PA:  Previously Tried and Failed:  see chart  Rationale:  see chart    Insurance   Primary: [facility]  Insurance ID:  [id number]     Pharmacy Information (if different than what is on RX)  [id number] - [place], [place] - [address] AT [address] OF [address] [facility] Information  [facility]

## 2024-09-30 NOTE — TELEPHONE ENCOUNTER
Patient is calling regarding his omeprazole rx and a question regarding lab result.    Patient states that his insurance will not cover his Omeprazole 20 mg 2 tabs daily. They will only one tablet daily. Patient is wondering if provider wants him to switch to 40 mg once daily. Writer did explain that this medication is OTC and patient seems agreeable to buying it if needed but wants provider recommendations.     Patient is also wondering about his WBC lab result as it was mildly elevated. No result notes.     Routing to provider to review and advise on patient questions.       SHANIA MayfieldN, RN  [facility]

## 2024-09-30 NOTE — TELEPHONE ENCOUNTER
Spoke with patient about potential causes for elevated WBC and he verbalized understanding. He will wait for a provider to comment on his labs. He otherwise is questioning if there is an extended all day release 40 mg omeprazole, or if a prescription can be put in for one 20 mg tablet. If he obtains the prescription for one 20 mg tablet, he will supplement with another 20 mg omeprazole OTC.     Dave Logn RN  [facility]

## 2024-10-01 ENCOUNTER — TELEPHONE (OUTPATIENT)
Dept: PEDIATRICS | Facility: CLINIC | Age: 39
End: 2024-10-01
Payer: COMMERCIAL

## 2024-10-01 NOTE — TELEPHONE ENCOUNTER
Prior Authorization Retail Medication Request    Medication/Dose: omeprazole (PRILOSEC) 20 MG DR capsule  Diagnosis and ICD code (if different than what is on RX):  See Chart  New/renewal/insurance change PA/secondary ins. PA:  Previously Tried and Failed:  See Chart  Rationale:  See Chart    Insurance   Primary: [facility]  Insurance ID:  [id number]    Secondary (if applicable):See Chart  Insurance ID:  See Chart    Pharmacy Information (if different than what is on RX)  Name:  [facility]   Phone:  [phone]  Fax:[phone]  [facility] CODE: [id number]    Clinic Information  Preferred routing pool for dept communication: [facility]

## 2024-10-03 NOTE — TELEPHONE ENCOUNTER
Spoke with patient about WBC rechecking. He states he will call later and set up a lab only appointment.    In regards to his omeprazole. His insurance will not cover it as it is currently written. They will only cover one 20 mg capsule per day. This order will need to be changed to reflect one 20 mg capsule one time daily, and the patient will supplement with another 20 mg from OTC. Please change the order to reflect this if deemed appropriate and route back to CHARITO nguyen.     Dave Long RN  [facility]

## 2024-10-03 NOTE — TELEPHONE ENCOUNTER
Pt has just mild elevation of wbc.   Generally of no concern or sign of trouble.   Would recheck in 1-2 weeks.      Omeprazole is generally considered at 24 hour medication, although some take it twice daily.

## 2024-10-03 NOTE — TELEPHONE ENCOUNTER
Central Prior Authorization Team   Phone: [phone]    PA Initiation    Medication: omeprazole (PRILOSEC) 20 MG DR capsule  Insurance Company: [employer] [place] - Phone [phone] Fax [phone]  Pharmacy Filling the Rx: [id number] - [place], [place] - [address] AT [address]  Filling Pharmacy Phone: [phone]  Filling Pharmacy Fax:    Start Date: 10/3/2024

## 2024-10-04 NOTE — TELEPHONE ENCOUNTER
Patient calling in to check status of medication request from yesterday, see previous note regarding insurance coverage.     Appears prior auth has been started as well.     Karey Melvin RN  [facility]

## 2024-10-07 NOTE — TELEPHONE ENCOUNTER
Left message to return call. If patient calls back, please route to [facility].     TCs, please send to RNs.    Dave Long RN  [facility]

## 2024-10-09 DIAGNOSIS — R89.9 ABNORMAL LABORATORY TEST RESULT: Primary | ICD-10-CM

## 2024-10-09 NOTE — TELEPHONE ENCOUNTER
Spoke with patient. Informed prescription was written for one tablet daily.  Sana Duarte RN  [facility]

## 2025-04-08 ENCOUNTER — NURSE TRIAGE (OUTPATIENT)
Dept: PEDIATRICS | Facility: CLINIC | Age: 40
End: 2025-04-08
Payer: COMMERCIAL

## 2025-04-08 NOTE — TELEPHONE ENCOUNTER
Per chart review patient not established at this time with Dr. Patel, has never been seen in person at [facility], last VV in [facility] 3/1/22 with Dr. Patel.     Patient to follow recommendations provided below and is to call clinic for further assistance.     Rescheduled for earlier OV with new PCP  4/14/2025 9:30 AM (Arrive by 9:10 AM) Genevieve Rodriguez MD [facility]     Klarissa Cade RN

## 2025-04-08 NOTE — TELEPHONE ENCOUNTER
S-(situation): Right leg discomfort, new onset chest discomfort     B-(background): Discomfort in right leg behind knee onset Wed 4/2. No known injury, patient does use elliptical frequently but light resistance.  Left pain resolved today but patient woke with some chest discomfort. No HX of DV or PE. Per patient heavy smoker.    A-(assessment): Patient described that discomfort in leg did not feel like pain but more of an ache. Located behind right knee-toward medical line. It was constant since onset. Denied redness, swelling, dusky skin, bruising, protrusions. He was able to ambulate at baseline. Woke today with pain resolved. Today he has new onset generalized chest discomfort and some coughing. Described that it was not chest pain but more of a pectoral discomfort. Symptom have since resolved since being on the phone during triage. He is able to breath without issue or wheeze. Could speak easily during triage.     R-(recommendations): We discussed at length recommendation for evaluation. Patient is apprehensive since symptoms have resolved during call but he is very concerned about  DVT or PE and was audibly anxious. We discussed that evaluation would be needed to rule this out. He asked if he could just move up his OV and have lab completed for things like cholesterol. I talked about having an OV will be a good idea in the future but that an evaluation at a higher level of care would be needed to rule out current concerns. He is still apprehensive to being seen today since symptoms have resolved. I explained the option of ADS vs ED. I discussed that I have to give a recommendation but he can decide whether he would like to do this as he has the autonomy to do that. He is going to think about it. I again gave the recommendation and why and encouraged him to call back with any questions or concerns. He would like to see if his est care appt can be sooner so I routed call to [facility] to assist.     Reason for  Disposition   Thigh or calf pain in only one leg and present > 1 hour    Additional Information   Negative: Chest pain   Negative: Difficulty breathing   Negative: Entire foot is cool or blue in comparison to other side   Negative: Unable to walk   Negative: Fever and red area (or area very tender to touch)   Negative: Swollen joint and fever    Protocols used: Leg Pain-A-OH

## 2025-06-14 ENCOUNTER — HEALTH MAINTENANCE LETTER (OUTPATIENT)
Age: 40
End: 2025-06-14

## 2025-07-22 ENCOUNTER — NURSE TRIAGE (OUTPATIENT)
Dept: PEDIATRICS | Facility: CLINIC | Age: 40
End: 2025-07-22
Payer: COMMERCIAL

## 2025-07-22 NOTE — TELEPHONE ENCOUNTER
Nurse Triage SBAR    Is this a 2nd Level Triage? NO    Situation: patient calls to report a fever    Background: 39year old male otherwise healthy, reports fever starting this morning.    Assessment: Woke up at 7am with 102 fever with associated chills and body aches.     9am - 400mg ibuprofen    11:30 - 325 tylenol/200ibuprofen    2:30 - 2 dual action 650tylenol/400 ibuprofen    Reviewed appropriate dosing of OTC  medications per care advice. Advised patient to follow package instructions.    Denies sneezing, cough, urinary symptoms, change in bowels, chest pain, difficulty breathing.    Protocol Recommended Disposition:   Home Care    Recommendation: Per RN protocol, advised on home care. Reviewed care advice under care tab. Advised patient to call back with any new or worsening symptoms or if fever persists for >3 days. Patient verbalized understanding and agreement in plan.       Ely YAÑEZ RN  7/22/2025 at 6:16 PM  [facility]      Reason for Disposition   Fever with no signs of serious infection or localizing symptoms    Additional Information   Negative: Difficult to awaken or acting confused (e.g., disoriented, slurred speech)   Negative: Pale cold skin and very weak (can't stand)   Negative: Difficulty breathing and bluish (or gray) lips or face   Negative: New-onset rash with purple (or blood-colored) spots or dots   Negative: Sounds like a life-threatening emergency to the triager   Negative: Taking antibiotic for infection (follow-up call)   Negative: Pregnant   Negative: Postpartum (from 0 to 6 weeks after delivery)   Negative: Fever onset within 24 hours of receiving vaccine   Negative: Fever within 14 days of COVID-19 Exposure   Negative: Follow-up call after recent diagnosis or treatment by a doctor (or NP/PA)   Negative: Other symptom is present, use that symptom guideline first (e.g., Diarrhea, Sore Throat).   Negative: Headache and stiff neck (can't touch chin to chest)   Negative: Difficulty  breathing   Negative: IV Drug Use (IVDU)   Negative: Fever > 104 F (40 C)   Negative: Fever > 100 F (37.8 C) and indwelling urinary catheter (e.g., Kinney, coude)   Negative: Fever > 100 F (37.8 C) and has port (portacath), central line, or PICC line   Negative: Drinking very little and dehydration suspected (e.g., no urine > 12 hours, very dry mouth, very lightheaded)   Negative: Patient sounds very sick or weak to the triager   Negative: Fever > 101 F (38.3 C) and over 60 years of age   Negative: Fever > 100 F (37.8 C) and diabetes mellitus or a weak immune system (e.g., HIV positive, chemotherapy, splenectomy)   Negative: Fever > 100 F (37.8 C) and bedridden (e.g., CVA, chronic illness, recovering from surgery)   Negative: Fever > 100.4 F (38.0 C) and surgery in the past month   Negative: Transplant patient (e.g., liver, heart, lung, kidney)   Negative: Widespread rash and cause unknown   Negative: SEVERE chills (i.e., feeling extremely cold WITH shaking chills)   Negative: Patient wants to be seen   Negative: Fever present > 3 days (72 hours)   Negative: Fever comes and goes (intermittent) and lasts > 3 weeks   Negative: Fever > 100 F (37.8 C) and foreign travel to a developing country in the past month    Protocols used: Fever-A-OH

## 2025-07-31 ENCOUNTER — VIRTUAL VISIT (OUTPATIENT)
Dept: PEDIATRICS | Facility: CLINIC | Age: 40
End: 2025-07-31
Payer: COMMERCIAL

## 2025-07-31 DIAGNOSIS — J01.00 ACUTE NON-RECURRENT MAXILLARY SINUSITIS: Primary | ICD-10-CM

## 2025-07-31 DIAGNOSIS — K21.00 GASTROESOPHAGEAL REFLUX DISEASE WITH ESOPHAGITIS, UNSPECIFIED WHETHER HEMORRHAGE: ICD-10-CM

## 2025-07-31 DIAGNOSIS — J02.9 SORE THROAT: ICD-10-CM

## 2025-07-31 RX ORDER — PANTOPRAZOLE SODIUM 40 MG/1
40 TABLET, DELAYED RELEASE ORAL 2 TIMES DAILY
Qty: 60 TABLET | Refills: 1 | Status: SHIPPED | OUTPATIENT
Start: 2025-07-31

## 2025-07-31 NOTE — PROGRESS NOTES
Wicho is a 39 year old who is being evaluated via a billable video visit.          Assessment & Plan     Acute non-recurrent maxillary sinusitis  Recommend Augmentin for possible sinus infection following recent viral illness.   Pt with persistent headaches and pressure.   Follow-up in clinic if not improving.  - amoxicillin-clavulanate (AUGMENTIN) 875-125 MG tablet  Dispense: 20 tablet; Refill: 0    Sore throat  Possible this is strep or COVID related. Could also be due to sinus drainage or GERD.   - Streptococcus A Rapid Screen w/Reflex to PCR - Clinic Collect    Gastroesophageal reflux disease with esophagitis, unspecified whether hemorrhage  Pt with symptoms on omeprazole 20mg BID.   Recommend switch to Pantoprazole BID. Unsure if insurance will cover this but will send through.   Recommend follow-up if not improving.   Advised he is having break through symptoms and should have more workup done.   EGD 2-3 years ago. Consider another one or follow-up with GI  - pantoprazole (PROTONIX) 40 MG EC tablet  Dispense: 60 tablet; Refill: 1    FUTURE APPOINTMENTS:  -Follow-up as needed for acute concerns      Subjective   Wicho is a 39 year old, presenting for the following health issues:  Sinusitis and sore throat    HPI      History of Present Illness-  Wicho Garner, 39-year-old male presents for persistent sinus pressure and sore throat following recent URI last week.   - History of frequent strep throat in childhood, none in past 3-4 years  - Last week patient suspects he had COVID due to describing that he had the worst illness experienced. This part is better. No fevers or chills. No SOB or cough  -persistent headache since onset, appetite and bowel movements normal  - Since yesterday onset of severe sore throat with significant swelling, pain with swallowing, two white spots noted in throat  -Has very tender maxillary sinuses to touch, persistent nasal congestion daily, headache located centrally,  described as lingering and irritating, pain in sinuses worsened this morning  - No recent antibiotic use  - Chronic severe GERD since late teens, managed with omeprazole 20 mg twice daily and frequent Tums use, symptoms persist regardless of food intake, no consistent triggers, prior endoscopy (2-3 years ago) with balloon dilation, no follow-up with GI after scope      Review of Systems  Constitutional, HEENT, cardiovascular, pulmonary, gi and gu systems are negative, except as otherwise noted.      Objective       Vitals:  No vitals were obtained today due to virtual visit.    Physical Exam   GENERAL: alert and no distress  EYES: Eyes grossly normal to inspection.  No discharge or erythema, or obvious scleral/conjunctival abnormalities.  RESP: No audible wheeze, cough, or visible cyanosis.    SKIN: Visible skin clear. No significant rash, abnormal pigmentation or lesions.  NEURO: Cranial nerves grossly intact.  Mentation and speech appropriate for age.  PSYCH: Appropriate affect, tone, and pace of words        Video-Visit Details    Type of service:  Video Visit   Originating Location (pt. Location): Home    Distant Location (provider location):  On-site  Platform used for Video Visit: Fox    The longitudinal plan of care for the diagnosis(es)/condition(s) as documented were addressed during this visit. Due to the added complexity in care, I will continue to support Wicho in the subsequent management and with ongoing continuity of care.    Signed Electronically by: Samanta Avila PA-C

## 2025-08-01 ENCOUNTER — TELEPHONE (OUTPATIENT)
Dept: PEDIATRICS | Facility: CLINIC | Age: 40
End: 2025-08-01
Payer: COMMERCIAL

## 2025-08-01 DIAGNOSIS — K21.00 GASTROESOPHAGEAL REFLUX DISEASE WITH ESOPHAGITIS, UNSPECIFIED WHETHER HEMORRHAGE: Primary | ICD-10-CM

## 2025-08-20 RX ORDER — OMEPRAZOLE 40 MG/1
40 CAPSULE, DELAYED RELEASE ORAL 2 TIMES DAILY
Qty: 180 CAPSULE | Refills: 1 | Status: SHIPPED | OUTPATIENT
Start: 2025-08-20